# Patient Record
Sex: FEMALE | Race: WHITE | NOT HISPANIC OR LATINO
[De-identification: names, ages, dates, MRNs, and addresses within clinical notes are randomized per-mention and may not be internally consistent; named-entity substitution may affect disease eponyms.]

---

## 2024-06-14 ENCOUNTER — NON-APPOINTMENT (OUTPATIENT)
Age: 24
End: 2024-06-14

## 2024-06-14 ENCOUNTER — APPOINTMENT (OUTPATIENT)
Dept: OPHTHALMOLOGY | Facility: CLINIC | Age: 24
End: 2024-06-14
Payer: COMMERCIAL

## 2024-06-14 PROCEDURE — 92083 EXTENDED VISUAL FIELD XM: CPT

## 2024-06-14 PROCEDURE — 92004 COMPRE OPH EXAM NEW PT 1/>: CPT

## 2024-06-14 PROCEDURE — 92060 SENSORIMOTOR EXAMINATION: CPT

## 2024-06-23 ENCOUNTER — TRANSCRIPTION ENCOUNTER (OUTPATIENT)
Age: 24
End: 2024-06-23

## 2024-07-01 ENCOUNTER — APPOINTMENT (OUTPATIENT)
Dept: MRI IMAGING | Facility: CLINIC | Age: 24
End: 2024-07-01
Payer: COMMERCIAL

## 2024-07-01 PROCEDURE — 70543 MRI ORBT/FAC/NCK W/O &W/DYE: CPT

## 2024-07-01 PROCEDURE — A9585: CPT | Mod: JW

## 2024-07-01 PROCEDURE — 70553 MRI BRAIN STEM W/O & W/DYE: CPT

## 2024-07-09 ENCOUNTER — NON-APPOINTMENT (OUTPATIENT)
Age: 24
End: 2024-07-09

## 2024-07-09 ENCOUNTER — APPOINTMENT (OUTPATIENT)
Dept: OPHTHALMOLOGY | Facility: CLINIC | Age: 24
End: 2024-07-09
Payer: COMMERCIAL

## 2024-07-09 PROBLEM — Z00.00 ENCOUNTER FOR PREVENTIVE HEALTH EXAMINATION: Status: ACTIVE | Noted: 2024-07-09

## 2024-07-09 PROCEDURE — 92083 EXTENDED VISUAL FIELD XM: CPT

## 2024-07-09 PROCEDURE — 92014 COMPRE OPH EXAM EST PT 1/>: CPT

## 2024-07-09 PROCEDURE — 92060 SENSORIMOTOR EXAMINATION: CPT

## 2024-07-15 ENCOUNTER — APPOINTMENT (OUTPATIENT)
Dept: NEUROLOGY | Facility: CLINIC | Age: 24
End: 2024-07-15
Payer: COMMERCIAL

## 2024-07-15 ENCOUNTER — NON-APPOINTMENT (OUTPATIENT)
Age: 24
End: 2024-07-15

## 2024-07-15 ENCOUNTER — LABORATORY RESULT (OUTPATIENT)
Age: 24
End: 2024-07-15

## 2024-07-15 VITALS
BODY MASS INDEX: 34.15 KG/M2 | WEIGHT: 200 LBS | HEART RATE: 78 BPM | OXYGEN SATURATION: 98 % | TEMPERATURE: 98 F | HEIGHT: 64 IN | DIASTOLIC BLOOD PRESSURE: 83 MMHG | SYSTOLIC BLOOD PRESSURE: 122 MMHG

## 2024-07-15 DIAGNOSIS — Z83.3 FAMILY HISTORY OF DIABETES MELLITUS: ICD-10-CM

## 2024-07-15 DIAGNOSIS — Z78.9 OTHER SPECIFIED HEALTH STATUS: ICD-10-CM

## 2024-07-15 DIAGNOSIS — E11.628 TYPE 2 DIABETES MELLITUS WITH OTHER SKIN COMPLICATIONS: ICD-10-CM

## 2024-07-15 DIAGNOSIS — G37.9 DEMYELINATING DISEASE OF CENTRAL NERVOUS SYSTEM, UNSPECIFIED: ICD-10-CM

## 2024-07-15 DIAGNOSIS — H46.9 UNSPECIFIED OPTIC NEURITIS: ICD-10-CM

## 2024-07-15 DIAGNOSIS — E11.638 TYPE 2 DIABETES MELLITUS WITH OTHER ORAL COMPLICATIONS: ICD-10-CM

## 2024-07-15 PROCEDURE — G2211 COMPLEX E/M VISIT ADD ON: CPT | Mod: NC,1L

## 2024-07-15 PROCEDURE — 99205 OFFICE O/P NEW HI 60 MIN: CPT

## 2024-07-15 RX ORDER — METHYLPHENIDATE HYDROCHLORIDE 40 MG/1
40 CAPSULE, EXTENDED RELEASE ORAL
Refills: 0 | Status: ACTIVE | COMMUNITY

## 2024-07-16 LAB
25(OH)D3 SERPL-MCNC: 24.2 NG/ML
ALBUMIN SERPL ELPH-MCNC: 4.5 G/DL
ALP BLD-CCNC: 67 U/L
ALT SERPL-CCNC: 19 U/L
ANION GAP SERPL CALC-SCNC: 11 MMOL/L
APTT BLD: 30.2 SEC
AST SERPL-CCNC: 17 U/L
BASOPHILS # BLD AUTO: 0.04 K/UL
BASOPHILS NFR BLD AUTO: 0.8 %
BILIRUB SERPL-MCNC: 0.4 MG/DL
BUN SERPL-MCNC: 7 MG/DL
CALCIUM SERPL-MCNC: 9.7 MG/DL
CD16+CD56+ CELLS # BLD: 59 CELLS/UL
CD16+CD56+ CELLS NFR BLD: 5 %
CD19 CELLS NFR BLD: 113 CELLS/UL
CD3 CELLS # BLD: 864 CELLS/UL
CD3 CELLS NFR BLD: 81 %
CD3+CD4+ CELLS # BLD: 594 CELLS/UL
CD3+CD4+ CELLS NFR BLD: 56 %
CD3+CD4+ CELLS/CD3+CD8+ CLL SPEC: 2.59 RATIO
CD3+CD8+ CELLS # SPEC: 229 CELLS/UL
CD3+CD8+ CELLS NFR BLD: 21 %
CELLS.CD3-CD19+/CELLS IN BLOOD: 11 %
CHLORIDE SERPL-SCNC: 106 MMOL/L
CHOLEST SERPL-MCNC: 187 MG/DL
CO2 SERPL-SCNC: 23 MMOL/L
CREAT SERPL-MCNC: 0.78 MG/DL
DEPRECATED KAPPA LC FREE/LAMBDA SER: 1.05 RATIO
DSDNA AB SER-ACNC: <1 IU/ML
EGFR: 108 ML/MIN/1.73M2
ENA RNP AB SER IA-ACNC: <0.2 AL
ENA SM AB SER IA-ACNC: <0.2 AL
ENA SS-A AB SER IA-ACNC: <0.2 AL
ENA SS-B AB SER IA-ACNC: <0.2 AL
EOSINOPHIL # BLD AUTO: 0.1 K/UL
EOSINOPHIL NFR BLD AUTO: 2.1 %
ESTIMATED AVERAGE GLUCOSE: 105 MG/DL
GLUCOSE SERPL-MCNC: 83 MG/DL
HBA1C MFR BLD HPLC: 5.3 %
HBV CORE IGG+IGM SER QL: NONREACTIVE
HBV SURFACE AB SERPL IA-ACNC: 522.2 MIU/ML
HBV SURFACE AG SER QL: NONREACTIVE
HCT VFR BLD CALC: 46.2 %
HCV AB SER QL: NONREACTIVE
HCV S/CO RATIO: 0.08 S/CO
HDLC SERPL-MCNC: 44 MG/DL
HGB BLD-MCNC: 14.8 G/DL
HIV1+2 AB SPEC QL IA.RAPID: NONREACTIVE
IGA SER QL IEP: 201 MG/DL
IGG SER QL IEP: 1280 MG/DL
IGM SER QL IEP: 110 MG/DL
IMM GRANULOCYTES NFR BLD AUTO: 0.4 %
INR PPP: 0.95 RATIO
KAPPA LC CSF-MCNC: 1.49 MG/DL
KAPPA LC SERPL-MCNC: 1.57 MG/DL
LDLC SERPL CALC-MCNC: 127 MG/DL
LYMPHOCYTES # BLD AUTO: 1.3 K/UL
LYMPHOCYTES NFR BLD AUTO: 26.8 %
MAN DIFF?: NORMAL
MCHC RBC-ENTMCNC: 28.1 PG
MCHC RBC-ENTMCNC: 32 GM/DL
MCV RBC AUTO: 87.7 FL
MONOCYTES # BLD AUTO: 0.41 K/UL
MONOCYTES NFR BLD AUTO: 8.5 %
NEUTROPHILS # BLD AUTO: 2.98 K/UL
NEUTROPHILS NFR BLD AUTO: 61.4 %
NONHDLC SERPL-MCNC: 142 MG/DL
PLATELET # BLD AUTO: 237 K/UL
POTASSIUM SERPL-SCNC: 4 MMOL/L
PROT SERPL-MCNC: 7.4 G/DL
PT BLD: 10.8 SEC
RBC # BLD: 5.27 M/UL
RBC # FLD: 13.1 %
RHEUMATOID FACT SER QL: <10 IU/ML
SODIUM SERPL-SCNC: 140 MMOL/L
TRIGL SERPL-MCNC: 85 MG/DL
TSH SERPL-ACNC: 1.31 UIU/ML
VIT B12 SERPL-MCNC: 327 PG/ML
WBC # FLD AUTO: 4.85 K/UL

## 2024-07-17 LAB
ANA SER IF-ACNC: NEGATIVE
M TB IFN-G BLD-IMP: NEGATIVE
QUANTIFERON TB PLUS MITOGEN MINUS NIL: >10 IU/ML
QUANTIFERON TB PLUS NIL: 0.05 IU/ML
QUANTIFERON TB PLUS TB1 MINUS NIL: 0 IU/ML
QUANTIFERON TB PLUS TB2 MINUS NIL: 0 IU/ML

## 2024-07-22 LAB
AQP4 H2O CHANNEL AB SERPL IA-ACNC: NEGATIVE
T PALLIDUM AB SER QL IA: NEGATIVE
VZV AB TITR SER: NEGATIVE
VZV IGG SER IF-ACNC: 100.9 INDEX

## 2024-07-23 LAB
JCV INDEX: 2.68
STRATIFY JCV ANTIBODY: POSITIVE

## 2024-07-24 ENCOUNTER — APPOINTMENT (OUTPATIENT)
Dept: MRI IMAGING | Facility: CLINIC | Age: 24
End: 2024-07-24
Payer: COMMERCIAL

## 2024-07-24 LAB — MOG AB SER QL CBA IFA: NEGATIVE

## 2024-07-24 PROCEDURE — A9585: CPT | Mod: JW

## 2024-07-24 PROCEDURE — 72156 MRI NECK SPINE W/O & W/DYE: CPT

## 2024-07-24 PROCEDURE — 72157 MRI CHEST SPINE W/O & W/DYE: CPT

## 2024-07-29 ENCOUNTER — RESULT REVIEW (OUTPATIENT)
Age: 24
End: 2024-07-29

## 2024-07-29 ENCOUNTER — APPOINTMENT (OUTPATIENT)
Dept: INTERVENTIONAL RADIOLOGY/VASCULAR | Facility: HOSPITAL | Age: 24
End: 2024-07-29

## 2024-07-29 ENCOUNTER — OUTPATIENT (OUTPATIENT)
Dept: OUTPATIENT SERVICES | Facility: HOSPITAL | Age: 24
LOS: 1 days | End: 2024-07-29
Payer: COMMERCIAL

## 2024-07-29 LAB
APPEARANCE CSF: CLEAR — SIGNIFICANT CHANGE UP
APPEARANCE SPUN FLD: COLORLESS — SIGNIFICANT CHANGE UP
COLOR CSF: SIGNIFICANT CHANGE UP
CSF COMMENTS: SIGNIFICANT CHANGE UP
GLUCOSE CSF-MCNC: 59 MG/DL — SIGNIFICANT CHANGE UP (ref 40–70)
GRAM STN FLD: SIGNIFICANT CHANGE UP
GRAM STN FLD: SIGNIFICANT CHANGE UP
LYMPHOCYTES # CSF: 1 % — LOW (ref 40–80)
MONOS+MACROS NFR CSF: 1 % — LOW (ref 15–45)
NEUTROPHILS # CSF: 0 % — SIGNIFICANT CHANGE UP (ref 0–6)
NRBC NFR CSF: 2 /UL — SIGNIFICANT CHANGE UP (ref 0–5)
PROT CSF-MCNC: 23 MG/DL — SIGNIFICANT CHANGE UP (ref 15–45)
RBC # CSF: 1 /UL — HIGH (ref 0–0)
SPECIMEN SOURCE: SIGNIFICANT CHANGE UP
TUBE TYPE: SIGNIFICANT CHANGE UP

## 2024-07-29 PROCEDURE — 83916 OLIGOCLONAL BANDS: CPT

## 2024-07-29 PROCEDURE — 99152 MOD SED SAME PHYS/QHP 5/>YRS: CPT

## 2024-07-29 PROCEDURE — 62328 DX LMBR SPI PNXR W/FLUOR/CT: CPT

## 2024-07-29 PROCEDURE — 84157 ASSAY OF PROTEIN OTHER: CPT

## 2024-07-29 PROCEDURE — 87070 CULTURE OTHR SPECIMN AEROBIC: CPT

## 2024-07-29 PROCEDURE — 36415 COLL VENOUS BLD VENIPUNCTURE: CPT

## 2024-07-29 PROCEDURE — 89051 BODY FLUID CELL COUNT: CPT

## 2024-07-29 PROCEDURE — 88108 CYTOPATH CONCENTRATE TECH: CPT | Mod: 26

## 2024-07-29 PROCEDURE — 99153 MOD SED SAME PHYS/QHP EA: CPT

## 2024-07-29 PROCEDURE — 87530 HSV DNA QUANT: CPT

## 2024-07-29 PROCEDURE — 82945 GLUCOSE OTHER FLUID: CPT

## 2024-07-29 PROCEDURE — 87102 FUNGUS ISOLATION CULTURE: CPT

## 2024-07-29 PROCEDURE — 87205 SMEAR GRAM STAIN: CPT

## 2024-07-29 PROCEDURE — 88305 TISSUE EXAM BY PATHOLOGIST: CPT | Mod: 26

## 2024-07-31 LAB
CMV DNA # UR NAA DL=200: SIGNIFICANT CHANGE UP IU/ML
NON-GYNECOLOGICAL CYTOLOGY STUDY: SIGNIFICANT CHANGE UP

## 2024-08-03 LAB
CULTURE RESULTS: SIGNIFICANT CHANGE UP
SPECIMEN SOURCE: SIGNIFICANT CHANGE UP

## 2024-08-06 LAB
MISCELLANEOUS TEST NAME: SIGNIFICANT CHANGE UP
OLIGOCLONAL BANDS CSF ELPH-IMP: PRESENT

## 2024-08-19 ENCOUNTER — APPOINTMENT (OUTPATIENT)
Dept: NEUROLOGY | Facility: CLINIC | Age: 24
End: 2024-08-19
Payer: COMMERCIAL

## 2024-08-19 VITALS
OXYGEN SATURATION: 98 % | HEIGHT: 64 IN | HEART RATE: 77 BPM | BODY MASS INDEX: 34.15 KG/M2 | WEIGHT: 200 LBS | DIASTOLIC BLOOD PRESSURE: 78 MMHG | SYSTOLIC BLOOD PRESSURE: 108 MMHG | TEMPERATURE: 98.1 F

## 2024-08-19 DIAGNOSIS — G35 MULTIPLE SCLEROSIS: ICD-10-CM

## 2024-08-19 DIAGNOSIS — H81.90 UNSPECIFIED DISORDER OF VESTIBULAR FUNCTION, UNSPECIFIED EAR: ICD-10-CM

## 2024-08-19 DIAGNOSIS — G37.9 DEMYELINATING DISEASE OF CENTRAL NERVOUS SYSTEM, UNSPECIFIED: ICD-10-CM

## 2024-08-19 PROCEDURE — G2211 COMPLEX E/M VISIT ADD ON: CPT | Mod: NC

## 2024-08-19 PROCEDURE — 99215 OFFICE O/P EST HI 40 MIN: CPT

## 2024-08-21 DIAGNOSIS — H46.9 UNSPECIFIED OPTIC NEURITIS: ICD-10-CM

## 2024-08-21 RX ORDER — OCRELIZUMAB 300 MG/10ML
300 INJECTION INTRAVENOUS
Qty: 2 | Refills: 0 | Status: ACTIVE | COMMUNITY
Start: 2024-08-21 | End: 1900-01-01

## 2024-08-26 PROBLEM — G35 RELAPSING-REMITTING MULTIPLE SCLEROSIS: Status: ACTIVE | Noted: 2024-08-26

## 2024-08-26 RX ORDER — OCRELIZUMAB 300 MG/10ML
300 INJECTION INTRAVENOUS
Qty: 2 | Refills: 0 | Status: ACTIVE | COMMUNITY
Start: 2024-08-26 | End: 1900-01-01

## 2024-08-26 NOTE — HISTORY OF PRESENT ILLNESS
[FreeTextEntry1] : 24-year-old woman referred for right optic neuritis.  She reports symptoms began June 2024 developing a bright central scotoma and pain exacerbated by eye movements was referred to ophthalmology had an MRI that was abnormal that demonstrated an enhancing lesion of the right optic nerve and brain lesions typical for MS.  Patient denies brain fog numbness weakness imbalance bladder dysfunction constipation. Patient's vision is back to baseline. with resolution of pain.  August 19, 2024 Patient has been experiencing vestibular symptoms that have woken her from sleep for the past several days that began last week.  The episodes last several minutes then resolve.  No new focal deficits otherwise.   Past medical history includes ADHD and psoriasis. Medications include Ritalin. No known drug allergies

## 2024-08-26 NOTE — DATA REVIEWED
[de-identified] : MRI brain July 1, 2024 demonstrates 8 scattered lesions in the periventricular , subcortical, and juxtacortical white matter of the bilateral brain parenchyma, with an appearance most suggestive of a demyelinating process. None of the lesions enhance to suggest an active lesion. No infratentorial lesions are identified.  MRI cervical and thoracic spine with and without gadolinium July 24, 2024 There are no areas of signal abnormality or abnormal enhancement within the cervicothoracic cord to suggest sequelae of chronic or active demyelination. Mild cervical spondylosis without significant central canal or neural foraminal narrowing throughout the cervical or thoracic spine.  Lumbar puncture July 29, 2024 CSF WBC 2 RBC 1 protein 23 glucose 59, oligoclonal bands present, cultures Gram stain and viral PCR negative, cytology and flow cytometry negative  Labs reviewed from July 2024 hep B surface antibody positive, surface antigen negative, hep C negative, rheumatologic screening negative, VZV immune, HIV and syphilis negative, MOG and aquaporin 4 autoantibody negative, ,A1c 5.3

## 2024-08-26 NOTE — ASSESSMENT
Referred by: Cori Cuevas MD; Medical Diagnosis (from order):    Diagnosis Information      Diagnosis    724.4 (ICD-9-CM) - M54.16 (ICD-10-CM) - Lumbar radiculopathy                Physical Therapy -  Daily Treatment Note    Visit:  9     SUBJECTIVE                                                                                                             Patient reports she had severe right hip pain with picking up her five year old granddaughter. It is still sore. Pain / Symptoms:  Pain rating (out of 10): Current: 4     OBJECTIVE                                                                                                                          TREATMENT                                                                                                                  Therapeutic Exercise:  NuStep level 6 x 6 min   PROM hip IR/ER right  Bridges with green band around thighs x 20  Low ab brace with marches x 20 bilaterally  3D hip stretching all directions x 10 each bilaterally  Green band side stepping L/R x 30 ft each  Green band monster walk F/B x 30 ft each  Green band [place] walk F/B x 30 ft each    Manual Therapy:  Lateral hip mobs at varying angles with mobilization belt grade III right  Long axis hip traction right        Skilled input: verbal instruction/cues and tactile instruction/cues    Writer verbally educated and received verbal consent for hand placement, positioning of patient, and techniques to be performed today from patient for therapist position for techniques and hand placement and palpation for techniques as described above and how they are pertinent to the patient's plan of care. Home Exercise Program: (*above indicates provided as part of home exercise program)   Access Code: [email or web address]   URL: [email or web address]   Date: 07/15/2020   Prepared by: [address] [place] [address]: Hamstring stretch - 2 sets - 30 hold - 1x daily - 7x weekly   Hip Flexor [FreeTextEntry1] : 24-year-old woman with right optic neuritis and brain MRI typical for MS with periventricular and juxtacortical T2 lesions.  CSF specific oligoclonal bands present. Altogether, this patient meets 2017 [place] Diagnostic Criteria for relapsing-remitting multiple sclerosis with at least one clinical attack, dissemination in space and time, and reasonable exclusion of alternative etiologies.  Recent vestibular symptoms concerning for neuroinflammatory activity versus paroxysmal vestibular symptoms related to a symptomatic lesion.    I discussed the diagnosis with the patient providing insights into the natural history of the disease, contributory risk factors, lifestyle modification (diet, exercise, avoiding smoking, vitamin D supplementation), and recommendation for early treatment with an approved MS-specific disease modifying therapy, with preference for a high-efficacy agent such as ofatumumab, ocrelizumab.  given preferred route of administration and risk factors for aggressive disease (e.g., high T2 burden at time of diagnosis, recent vestibular symptoms). 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 Averse to taking daily pills.        Plan:   Start Ocrevus  CBC, CMP, immunoglobulin, T cell subset every 6 months   Vitamin D 2000 U oral supplement daily   Physical therapy and stay active with fall precautions.   Counselled on Uthoff phenomena, avoid excessive overheating when possible   May consider gabapentin if neuropathic symptoms become bothersome to patient   Return to clinic next week to discuss initiation of MS-specific treatment   Stretch at [employer] of Bed - 2 sets - 30 hold - 1x daily - 7x weekly   Bridge - 10 reps - 2 sets - 5 hold - 1x daily - 7x weekly   Clamshell with Resistance - 10 reps - 2 sets - 1x daily - 7x weekly   Butterfly Bridge - 10 reps - 2 sets - 1x daily - 7x weekly   Side Stepping with Resistance at Ankles - 10 reps - 3 sets - 1x daily - 7x weekly   Forward Monster Walks - 10 reps - 3 sets - 1x daily - 7x weekly   Backward Monster Walks - 10 reps - 3 sets - 1x daily - 7x weekly   Prone Heel Squeeze - 10 reps - 2 sets - 5 hold - 1x daily - 7x weekly   [place]: Thoracic type 1 right rotation, left SB - 10 reps - 1 sets - 2x daily - 7x weekly   [place]: thoracic type 1 left rotation, right SB - 10 reps - 1 sets - 2x daily - 7x weekly   [place]: thoracic type 2 left rotation, left SB - 10 reps - 1 sets - 2x daily - 7x weekly   [place]: Thoracic type 2 right rotation right SB - 10 reps - 1 sets - 2x daily - 7x weekly         ASSESSMENT                                                                                                             Patient reported increased muscle soreness after session. She reported feeling stretching in both hips with 3D hip stretching. PLAN                                                                                                                             Suggestions for next session as indicated: Progress per plan of care, continue with hip mobs, core strengthening, hip strengthening       Procedures and total treatment time documented Time Entry flowsheet.

## 2024-08-26 NOTE — ASSESSMENT
[FreeTextEntry1] : 24-year-old woman with right optic neuritis and brain MRI typical for MS with periventricular and juxtacortical T2 lesions.  CSF specific oligoclonal bands present. Altogether, this patient meets 2017 [place] Diagnostic Criteria for relapsing-remitting multiple sclerosis with at least one clinical attack, dissemination in space and time, and reasonable exclusion of alternative etiologies.  Recent vestibular symptoms concerning for neuroinflammatory activity versus paroxysmal vestibular symptoms related to a symptomatic lesion.    I discussed the diagnosis with the patient providing insights into the natural history of the disease, contributory risk factors, lifestyle modification (diet, exercise, avoiding smoking, vitamin D supplementation), and recommendation for early treatment with an approved MS-specific disease modifying therapy, with preference for a high-efficacy agent such as ofatumumab, ocrelizumab.  given preferred route of administration and risk factors for aggressive disease (e.g., high T2 burden at time of diagnosis, recent vestibular symptoms). 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 Averse to taking daily pills.        Plan:   Start Ocrevus  CBC, CMP, immunoglobulin, T cell subset every 6 months   Vitamin D 2000 U oral supplement daily   Physical therapy and stay active with fall precautions.   Counselled on Uthoff phenomena, avoid excessive overheating when possible   May consider gabapentin if neuropathic symptoms become bothersome to patient   Return to clinic next week to discuss initiation of MS-specific treatment

## 2024-08-26 NOTE — PHYSICAL EXAM
[FreeTextEntry1] : AO3. Normally conversant. full affect. Follows commands, names, and repeats. Good attention.     PERRL, VFF, EOMI, horizontal nystagmus present (L>R) bilaterally, face symmetric, TUP at midline.     Motor:        R:    L:  Del      5    5  Bi      5    5  Tri      5    5  Wrist Extensors   5    5  Finger abductors   5    5       5    5     HF      5    5  KE      5    5  KF      5    5  DF      5    5  PF      5    5     Tone     R    L  UE        0     0  LE        0     0     Sensory    RUE   LUE   RLE  LLE  LT      +    +   +   +  Vib      +    +   +   +  JPS      +    +   +   +  PP      +    +   +   +  Temp     +    +   +   +     Reflexes:        R    L   Biceps  3 3  BR  3 3  Pat    3 3  AJ     3 3        Coordination:        R    L  FTN     0    0  BHUPINDER     0    0  HTS     0    0     Other            Gait: normal, can heel, toe, tandem        EDSS Step: 2.0       Functional system scores: Visual 0 Brainstem 2 Pyramidal 1 Cerebellar 0 Sensory 0 Bowel and bladder 0 Cerebral 0 Unrestricted gait

## 2024-08-26 NOTE — DATA REVIEWED
[de-identified] : MRI brain July 1, 2024 demonstrates 8 scattered lesions in the periventricular , subcortical, and juxtacortical white matter of the bilateral brain parenchyma, with an appearance most suggestive of a demyelinating process. None of the lesions enhance to suggest an active lesion. No infratentorial lesions are identified.  MRI cervical and thoracic spine with and without gadolinium July 24, 2024 There are no areas of signal abnormality or abnormal enhancement within the cervicothoracic cord to suggest sequelae of chronic or active demyelination. Mild cervical spondylosis without significant central canal or neural foraminal narrowing throughout the cervical or thoracic spine.  Lumbar puncture July 29, 2024 CSF WBC 2 RBC 1 protein 23 glucose 59, oligoclonal bands present, cultures Gram stain and viral PCR negative, cytology and flow cytometry negative  Labs reviewed from July 2024 hep B surface antibody positive, surface antigen negative, hep C negative, rheumatologic screening negative, VZV immune, HIV and syphilis negative, MOG and aquaporin 4 autoantibody negative, ,A1c 5.3

## 2024-09-09 ENCOUNTER — NON-APPOINTMENT (OUTPATIENT)
Age: 24
End: 2024-09-09

## 2024-09-09 ENCOUNTER — INPATIENT (INPATIENT)
Facility: HOSPITAL | Age: 24
LOS: 3 days | Discharge: HOME | DRG: 60 | End: 2024-09-13
Attending: PSYCHIATRY & NEUROLOGY | Admitting: PSYCHIATRY & NEUROLOGY
Payer: COMMERCIAL

## 2024-09-09 VITALS
WEIGHT: 199.96 LBS | TEMPERATURE: 98 F | RESPIRATION RATE: 16 BRPM | HEART RATE: 73 BPM | OXYGEN SATURATION: 99 % | DIASTOLIC BLOOD PRESSURE: 85 MMHG | SYSTOLIC BLOOD PRESSURE: 125 MMHG | HEIGHT: 64 IN

## 2024-09-09 LAB
ANION GAP SERPL CALC-SCNC: 9 MMOL/L — SIGNIFICANT CHANGE UP (ref 5–17)
BASOPHILS # BLD AUTO: 0.05 K/UL — SIGNIFICANT CHANGE UP (ref 0–0.2)
BASOPHILS NFR BLD AUTO: 0.7 % — SIGNIFICANT CHANGE UP (ref 0–2)
BUN SERPL-MCNC: 10 MG/DL — SIGNIFICANT CHANGE UP (ref 7–23)
CALCIUM SERPL-MCNC: 9.4 MG/DL — SIGNIFICANT CHANGE UP (ref 8.4–10.5)
CHLORIDE SERPL-SCNC: 104 MMOL/L — SIGNIFICANT CHANGE UP (ref 96–108)
CO2 SERPL-SCNC: 26 MMOL/L — SIGNIFICANT CHANGE UP (ref 22–31)
CREAT SERPL-MCNC: 0.84 MG/DL — SIGNIFICANT CHANGE UP (ref 0.5–1.3)
EGFR: 99 ML/MIN/1.73M2 — SIGNIFICANT CHANGE UP
EOSINOPHIL # BLD AUTO: 0.15 K/UL — SIGNIFICANT CHANGE UP (ref 0–0.5)
EOSINOPHIL NFR BLD AUTO: 2.1 % — SIGNIFICANT CHANGE UP (ref 0–6)
GLUCOSE SERPL-MCNC: 100 MG/DL — HIGH (ref 70–99)
HCT VFR BLD CALC: 44 % — SIGNIFICANT CHANGE UP (ref 34.5–45)
HGB BLD-MCNC: 14.7 G/DL — SIGNIFICANT CHANGE UP (ref 11.5–15.5)
IMM GRANULOCYTES NFR BLD AUTO: 0.3 % — SIGNIFICANT CHANGE UP (ref 0–0.9)
LYMPHOCYTES # BLD AUTO: 2.33 K/UL — SIGNIFICANT CHANGE UP (ref 1–3.3)
LYMPHOCYTES # BLD AUTO: 32.8 % — SIGNIFICANT CHANGE UP (ref 13–44)
MCHC RBC-ENTMCNC: 27.9 PG — SIGNIFICANT CHANGE UP (ref 27–34)
MCHC RBC-ENTMCNC: 33.4 GM/DL — SIGNIFICANT CHANGE UP (ref 32–36)
MCV RBC AUTO: 83.7 FL — SIGNIFICANT CHANGE UP (ref 80–100)
MONOCYTES # BLD AUTO: 0.46 K/UL — SIGNIFICANT CHANGE UP (ref 0–0.9)
MONOCYTES NFR BLD AUTO: 6.5 % — SIGNIFICANT CHANGE UP (ref 2–14)
NEUTROPHILS # BLD AUTO: 4.09 K/UL — SIGNIFICANT CHANGE UP (ref 1.8–7.4)
NEUTROPHILS NFR BLD AUTO: 57.6 % — SIGNIFICANT CHANGE UP (ref 43–77)
NRBC # BLD: 0 /100 WBCS — SIGNIFICANT CHANGE UP (ref 0–0)
PLATELET # BLD AUTO: 321 K/UL — SIGNIFICANT CHANGE UP (ref 150–400)
POTASSIUM SERPL-MCNC: 4.7 MMOL/L — SIGNIFICANT CHANGE UP (ref 3.5–5.3)
POTASSIUM SERPL-SCNC: 4.7 MMOL/L — SIGNIFICANT CHANGE UP (ref 3.5–5.3)
RBC # BLD: 5.26 M/UL — HIGH (ref 3.8–5.2)
RBC # FLD: 12.1 % — SIGNIFICANT CHANGE UP (ref 10.3–14.5)
SODIUM SERPL-SCNC: 139 MMOL/L — SIGNIFICANT CHANGE UP (ref 135–145)
WBC # BLD: 7.1 K/UL — SIGNIFICANT CHANGE UP (ref 3.8–10.5)
WBC # FLD AUTO: 7.1 K/UL — SIGNIFICANT CHANGE UP (ref 3.8–10.5)

## 2024-09-09 PROCEDURE — 99285 EMERGENCY DEPT VISIT HI MDM: CPT

## 2024-09-09 PROCEDURE — 99222 1ST HOSP IP/OBS MODERATE 55: CPT

## 2024-09-09 RX ORDER — METHYLPREDNISOLONE 4 MG
1000 TABLET ORAL ONCE
Refills: 0 | Status: COMPLETED | OUTPATIENT
Start: 2024-09-09 | End: 2024-09-09

## 2024-09-09 RX ORDER — PANTOPRAZOLE SODIUM 40 MG
40 TABLET, DELAYED RELEASE (ENTERIC COATED) ORAL
Refills: 0 | Status: DISCONTINUED | OUTPATIENT
Start: 2024-09-09 | End: 2024-09-13

## 2024-09-09 RX ORDER — ACETAMINOPHEN 325 MG/1
650 TABLET ORAL EVERY 6 HOURS
Refills: 0 | Status: DISCONTINUED | OUTPATIENT
Start: 2024-09-09 | End: 2024-09-13

## 2024-09-09 RX ORDER — ENOXAPARIN SODIUM 100 MG/ML
40 INJECTION SUBCUTANEOUS ONCE
Refills: 0 | Status: DISCONTINUED | OUTPATIENT
Start: 2024-09-09 | End: 2024-09-10

## 2024-09-09 RX ORDER — METHYLPREDNISOLONE 4 MG
1000 TABLET ORAL EVERY 24 HOURS
Refills: 0 | Status: DISCONTINUED | OUTPATIENT
Start: 2024-09-10 | End: 2024-09-11

## 2024-09-09 RX ORDER — ENOXAPARIN SODIUM 100 MG/ML
40 INJECTION SUBCUTANEOUS EVERY 24 HOURS
Refills: 0 | Status: DISCONTINUED | OUTPATIENT
Start: 2024-09-09 | End: 2024-09-09

## 2024-09-09 RX ORDER — LORAZEPAM 4 MG/ML
0.5 INJECTION INTRAMUSCULAR; INTRAVENOUS ONCE
Refills: 0 | Status: DISCONTINUED | OUTPATIENT
Start: 2024-09-09 | End: 2024-09-11

## 2024-09-09 NOTE — H&P ADULT - HISTORY OF PRESENT ILLNESS
Ms. Sands is a 24-year-old female with a PMHx of MS, who presents for 1 week of new left leg dragging/incoordination concerning for a relapse. The patient was diangosed with MS in August 2024, following an encounter for right optic neuritis in July. Brain MRI at the time was typical for MS with periventricular and juxtacortical T2 lesions. CSF specific oligoclonal bands were present. The patient states that she feels as if the leg is weak, and that if she puts weight on it, it might "give out", but she denies any numbness or tingling. She also denies falls, as well as urinary, respiratory, and infectious symptoms. She also denies vision loss, vertigo, and loss of consciousness.     ED Course:  V/S:  Temp: 98.5, HR: 73, BP: 125/85, RR: 16, SpO2: 99 RA  Pertinent Labs:  RBC: 5.26  EKG:  Pending  Imaging:  None  Interventions:  - Methylprednisolone 1g IV x 1 Ms. Sands is a 24-year-old female with a PMHx of MS, who presents for 1 week of new left leg dragging/incoordination concerning for a relapse. The patient was diagnosed with MS in August 2024, following an encounter for right optic neuritis in July (vision returned fully back to baseline). Brain MRI at the time was typical for MS with periventricular and juxtacortical T2 lesions. CSF specific oligoclonal bands were present. The patient states that she feels as if the leg is weak, and that if she puts weight on it, it might "give out", but she denies any numbness or tingling. She also denies falls, as well as urinary, respiratory, and infectious symptoms. She also denies vision loss, vertigo, and loss of consciousness.     ED Course:  V/S:  Temp: 98.5, HR: 73, BP: 125/85, RR: 16, SpO2: 99 RA  Pertinent Labs:  RBC: 5.26  EKG:  Pending  Imaging:  None  Interventions:  - Methylprednisolone 1g IV x 1

## 2024-09-09 NOTE — ED PROVIDER NOTE - CLINICAL SUMMARY MEDICAL DECISION MAKING FREE TEXT BOX
Normal vitals  4+/5 strength LLE, 5/5 strength all other extremities, sensation intact, gait deferred at this time  Consulted neurology, plan for basic labs, 1 g methylprednisolone IV, admission for further workup and treatment

## 2024-09-09 NOTE — H&P ADULT - NSHPSOCIALHISTORY_GEN_ALL_CORE
The patient lives in an apartment with roommates. She denies tobacco use and recreational drug use, but endorses social alcohol use (none since her current symptoms started)

## 2024-09-09 NOTE — H&P ADULT - NSHPLABSRESULTS_GEN_ALL_CORE
.  LABS:                         14.7   7.10  )-----------( 321      ( 09 Sep 2024 20:23 )             44.0     09-09    139  |  104  |  10  ----------------------------<  100<H>  4.7   |  26  |  0.84    Ca    9.4      09 Sep 2024 20:23        Urinalysis Basic - ( 09 Sep 2024 20:23 )    Color: x / Appearance: x / SG: x / pH: x  Gluc: 100 mg/dL / Ketone: x  / Bili: x / Urobili: x   Blood: x / Protein: x / Nitrite: x   Leuk Esterase: x / RBC: x / WBC x   Sq Epi: x / Non Sq Epi: x / Bacteria: x                RADIOLOGY, EKG & ADDITIONAL TESTS: Reviewed.

## 2024-09-09 NOTE — ED ADULT NURSE NOTE - OBJECTIVE STATEMENT
24 y.o. Female presents to ED c/o L leg weakness/difficulty ambulating. Recently diagnosed with multiple sclerosis a few weeks ago. Denies cp, sob, f/c, numbness/tingling, fall/trauma/injury. ambulates without assistive device. sent in by MD Ferrer the neurologist that diagnosed MS for administration of steroids. LMP 1 week ago.

## 2024-09-09 NOTE — H&P ADULT - ATTENDING COMMENTS
patient with multiple sclerosis. she had initial clinical event of optic neuritis in July but imaging c/w MS and now already with apparent MS exacerbation, suspect new thoracic spine lesion based on exam, but could be C spine or brain as well.    admit for 5 days solumedrol 1g IV daily  MRI brain, c, t, w and w/o  she will be getting her first ocrevus infusion after discharge

## 2024-09-09 NOTE — H&P ADULT - NSHPPHYSICALEXAM_GEN_ALL_CORE
PHYSICAL EXAM:    Constitutional: resting comfortably in bed; NAD  Head: NC/AT  Eyes: PERRL, EOMI, anicteric sclera  ENT: no nasal discharge; uvula midline, no oropharyngeal erythema or exudates; MMM  Neck: supple; no JVD or thyromegaly  Respiratory: CTA B/L; no W/R/R, no retractions  Cardiac: +S1/S2; RRR; no M/R/G;   Gastrointestinal: abdomen soft, NT/ND; no rebound or guarding; +BSx4  Back: spine midline, no bony tenderness or step-offs; no CVAT B/L  Extremities: WWP, no clubbing or cyanosis; no peripheral edema  Musculoskeletal: NROM x4; no joint swelling, tenderness or erythema  Vascular: 2+ radial, DP pulses B/L  Dermatologic: skin warm, dry and intact; no rashes, wounds, or scars PHYSICAL EXAM:    Constitutional: resting comfortably in bed; NAD  Head: NC/AT  Eyes: PERRL, EOMI, anicteric sclera  ENT: no nasal discharge; uvula midline, no oropharyngeal erythema or exudates; MMM  Neck: supple; no JVD or thyromegaly  Respiratory: CTA B/L; no W/R/R, no retractions  Cardiac: +S1/S2; RRR; no M/R/G;   Gastrointestinal: abdomen soft, NT/ND; no rebound or guarding; +BSx4  Back: spine midline, no bony tenderness or step-offs; no CVAT B/L  Extremities: WWP, no clubbing or cyanosis; no peripheral edema  Musculoskeletal: NROM x4; no joint swelling, tenderness or erythema  Vascular: 2+ radial, DP pulses B/L  Dermatologic: skin warm, dry and intact; no rashes, wounds, or scars    Neurological Exam:   Mental status: Awake, alert and oriented x3.  Recent and remote memory intact.  Attention/concentration intact.  No dysarthria, no aphasia.   Cranial nerves: Pupils equally round and reactive to light, visual fields full, no nystagmus, extraocular muscles intact, V1 through V3 intact bilaterally and symmetric, face symmetric, hearing intact to finger rub, palate elevation symmetric, tongue was midline.  Motor:  MRC grading 5/5 b/l UE; 4+/5 LLE; 5/5 RLE.   strength 5/5.  Normal tone and bulk.  No abnormal movements.    Sensation: Intact to light touch, vibration, temperature, and pinprick.   Coordination: No dysmetria on finger-to-nose and heel-to-shin.  No dysdiadochokinesia.  Reflexes: 2+ in biceps, triceps, brachioradialis, achilles b/l. 3+ left patellar, 2+ right patellar , downgoing toes bilaterally. (-) [place].  Gait: Narrow based and normal, but unsteady. Hesitation to put full weight on LLE on ambulation. No ataxia. Romberg negative.

## 2024-09-09 NOTE — ED ADULT TRIAGE NOTE - CHIEF COMPLAINT QUOTE
Pt reports new L leg weakness/ difficulty with ambulating. Pt has MS, sent for admission by neurologist Dr. Ferrer.

## 2024-09-09 NOTE — ED ADULT NURSE NOTE - CAS TRG GENERAL AIRWAY, MLM
Mid-Line Peripheral IV Placement     Procedure Note    Diagnosis:  Acute respiratory failure and cirrhosis; need for IV access    Consent:  Informed consent obtained from the patient prior to the procedure    Procedure:    Prior to procedure site was selected with screening ultrasound. Area was prepared with Chlorhexadine prep stick.    20 gauge Powerglide Mid-Line IV access catheter advanced and introduced into the right cephalic vein under ultrasound guidance. Once tip of needle in satisafactory position guide wire and subsequently IV catheter advanced without resistance. Short IV tubing attached and line flushed with 20 cc of sterile normal saline. Biopatch and securing device placed. Tegaderm and tape utiziled to secure and protect the site. Bedside RN updated.     Ultrasound:  Portable ultrasound was used but image not saved    Proceduralist: Gordy Loomis PA-C    Complications: none    Estimated blood loss: 1 mL    Of note, prior to this a right brachial midline was placed. It infiltrated and was removed without issue. Some extravasation noted, but with compressible compartments.     Gordy Loomis PA-C  [facility]           Patent

## 2024-09-09 NOTE — ED PROVIDER NOTE - PHYSICAL EXAMINATION
CONST: nontoxic NAD speaking in full sentences  HEAD: atraumatic  EYES: conjunctivae clear  NECK: supple  CARD: regular rate  CHEST: breathing comfortably, no stridor/retractions/tripoding  EXT: FROM  SKIN: warm, dry  NEURO: awake alert answering questions following commands moving all extremities . 4+/5 strength LLE, 5/5 strength all other extremities, sensation intact bilaterally, gait deferred

## 2024-09-09 NOTE — ED PROVIDER NOTE - OBJECTIVE STATEMENT
24-year-old female with recently diagnosed MS 1 month ago (presented with optic neuritis), comes in for admission for MS flare.  Patient states this week she has been having left leg weakness and some incoordination when walking her leg sometimes drags.  No falls, no numbness, no other symptoms.  Called her neurologist today who told her to come in for steroids and imaging.

## 2024-09-09 NOTE — H&P ADULT - ASSESSMENT
The patient is a 24-year-old female with a PMHx of MS, who presents for 1 week of new left leg dragging/incoordination concerning for a relapse. Admitted for IV methylprednisolone treatment and MRI brain/spine to further characterize the etiology of her symptoms and treat for likely MS relapse. Lumbar radiculopathy can be considered if imaging negative for any acute process/new legion.     Plan:    #MS  #LE incoordination  - Start methylprednisolone 1g IV x 5 days (last day 9/13)  - Order MR brain and full spine  - Start Protonix 40mg PO q24 hours  - FSG q6 hours while on steroids  - PT/OT Consult  - Fall precautions  - Plan to start Ocrevus as an outpatient post-admission    #ppx:  F: NI  E: Replete as needed; K<4.0, Mg<2.0, Phos<2.5  N: Regular diet  GI ppx: Protonix  DVT ppx: SCDs and Lovenox  Code: Full  Dispo: [facility] - Neuro service   The patient is a 24-year-old female with a PMHx of MS, who presents for 1 week of new left leg dragging/incoordination concerning for a relapse. Admitted for IV methylprednisolone treatment and MRI brain/spine to further characterize the etiology of her symptoms and treat for likely MS relapse.     Plan:    #MS  #LE incoordination  - Start methylprednisolone 1g IV x 5 days (last day 9/13)  - Order MR brain and full spine  - Start Protonix 40mg PO q24 hours  - FSG q6 hours while on steroids  - PT/OT Consult  - Fall precautions  - Plan to start Ocrevus as an outpatient post-admission    #ppx:  F: NI  E: Replete as needed; K<4.0, Mg<2.0, Phos<2.5  N: Regular diet  GI ppx: Protonix  DVT ppx: SCDs and Lovenox  Code: Full  Dispo: [facility] - Neuro service   The patient is a 24-year-old female with a PMHx of MS, who presents for 1 week of new left leg dragging/incoordination concerning for a relapse. Admitted for IV methylprednisolone treatment and MRI brain/spine to further characterize the etiology of her symptoms and treat for likely MS relapse.     Plan:    #MS  #LE incoordination  - Start methylprednisolone 1g IV x 5 days (last day 9/13)  - Order MR brain w/w/out, and MR C/T spines w/w/out  - Start Protonix 40mg PO q24 hours  - FSG q6 hours while on steroids  - PT/OT Consult  - Fall precautions  - Plan to start Ocrevus as an outpatient post-admission    #ppx:  F: NI  E: Replete as needed; K<4.0, Mg<2.0, Phos<2.5  N: Regular diet  GI ppx: Protonix  DVT ppx: SCDs and Lovenox  Code: Full  Dispo: [facility] - Neuro service

## 2024-09-10 LAB
ALBUMIN SERPL ELPH-MCNC: 4.5 G/DL — SIGNIFICANT CHANGE UP (ref 3.3–5)
ALP SERPL-CCNC: 73 U/L — SIGNIFICANT CHANGE UP (ref 40–120)
ALT FLD-CCNC: 21 U/L — SIGNIFICANT CHANGE UP (ref 10–45)
ANION GAP SERPL CALC-SCNC: 14 MMOL/L — SIGNIFICANT CHANGE UP (ref 5–17)
AST SERPL-CCNC: 17 U/L — SIGNIFICANT CHANGE UP (ref 10–40)
BASOPHILS # BLD AUTO: 0.01 K/UL — SIGNIFICANT CHANGE UP (ref 0–0.2)
BASOPHILS NFR BLD AUTO: 0.2 % — SIGNIFICANT CHANGE UP (ref 0–2)
BILIRUB SERPL-MCNC: 0.4 MG/DL — SIGNIFICANT CHANGE UP (ref 0.2–1.2)
BUN SERPL-MCNC: 8 MG/DL — SIGNIFICANT CHANGE UP (ref 7–23)
CALCIUM SERPL-MCNC: 10 MG/DL — SIGNIFICANT CHANGE UP (ref 8.4–10.5)
CHLORIDE SERPL-SCNC: 102 MMOL/L — SIGNIFICANT CHANGE UP (ref 96–108)
CO2 SERPL-SCNC: 21 MMOL/L — LOW (ref 22–31)
CREAT SERPL-MCNC: 0.73 MG/DL — SIGNIFICANT CHANGE UP (ref 0.5–1.3)
EGFR: 117 ML/MIN/1.73M2 — SIGNIFICANT CHANGE UP
EOSINOPHIL # BLD AUTO: 0 K/UL — SIGNIFICANT CHANGE UP (ref 0–0.5)
EOSINOPHIL NFR BLD AUTO: 0 % — SIGNIFICANT CHANGE UP (ref 0–6)
GLUCOSE SERPL-MCNC: 203 MG/DL — HIGH (ref 70–99)
HCT VFR BLD CALC: 49.8 % — HIGH (ref 34.5–45)
HGB BLD-MCNC: 16.2 G/DL — HIGH (ref 11.5–15.5)
IMM GRANULOCYTES NFR BLD AUTO: 0.5 % — SIGNIFICANT CHANGE UP (ref 0–0.9)
LYMPHOCYTES # BLD AUTO: 0.73 K/UL — LOW (ref 1–3.3)
LYMPHOCYTES # BLD AUTO: 17.2 % — SIGNIFICANT CHANGE UP (ref 13–44)
MAGNESIUM SERPL-MCNC: 2.1 MG/DL — SIGNIFICANT CHANGE UP (ref 1.6–2.6)
MCHC RBC-ENTMCNC: 28.3 PG — SIGNIFICANT CHANGE UP (ref 27–34)
MCHC RBC-ENTMCNC: 32.5 GM/DL — SIGNIFICANT CHANGE UP (ref 32–36)
MCV RBC AUTO: 86.9 FL — SIGNIFICANT CHANGE UP (ref 80–100)
MONOCYTES # BLD AUTO: 0.02 K/UL — SIGNIFICANT CHANGE UP (ref 0–0.9)
MONOCYTES NFR BLD AUTO: 0.5 % — LOW (ref 2–14)
NEUTROPHILS # BLD AUTO: 3.46 K/UL — SIGNIFICANT CHANGE UP (ref 1.8–7.4)
NEUTROPHILS NFR BLD AUTO: 81.6 % — HIGH (ref 43–77)
NRBC # BLD: 0 /100 WBCS — SIGNIFICANT CHANGE UP (ref 0–0)
PHOSPHATE SERPL-MCNC: 2.5 MG/DL — SIGNIFICANT CHANGE UP (ref 2.5–4.5)
PLATELET # BLD AUTO: 332 K/UL — SIGNIFICANT CHANGE UP (ref 150–400)
POTASSIUM SERPL-MCNC: 3.9 MMOL/L — SIGNIFICANT CHANGE UP (ref 3.5–5.3)
POTASSIUM SERPL-SCNC: 3.9 MMOL/L — SIGNIFICANT CHANGE UP (ref 3.5–5.3)
PROT SERPL-MCNC: 8.2 G/DL — SIGNIFICANT CHANGE UP (ref 6–8.3)
RBC # BLD: 5.73 M/UL — HIGH (ref 3.8–5.2)
RBC # FLD: 12.2 % — SIGNIFICANT CHANGE UP (ref 10.3–14.5)
SODIUM SERPL-SCNC: 137 MMOL/L — SIGNIFICANT CHANGE UP (ref 135–145)
TSH SERPL-MCNC: 0.65 UIU/ML — SIGNIFICANT CHANGE UP (ref 0.27–4.2)
WBC # BLD: 4.24 K/UL — SIGNIFICANT CHANGE UP (ref 3.8–10.5)
WBC # FLD AUTO: 4.24 K/UL — SIGNIFICANT CHANGE UP (ref 3.8–10.5)

## 2024-09-10 PROCEDURE — 99254 IP/OBS CNSLTJ NEW/EST MOD 60: CPT

## 2024-09-10 PROCEDURE — 99232 SBSQ HOSP IP/OBS MODERATE 35: CPT

## 2024-09-10 RX ORDER — ENOXAPARIN SODIUM 100 MG/ML
40 INJECTION SUBCUTANEOUS EVERY 24 HOURS
Refills: 0 | Status: DISCONTINUED | OUTPATIENT
Start: 2024-09-10 | End: 2024-09-13

## 2024-09-10 RX ORDER — FLU VACCINE TS 2012-2013(5YR+) 45MCG/.5ML
0.5 VIAL (ML) INTRAMUSCULAR ONCE
Refills: 0 | Status: DISCONTINUED | OUTPATIENT
Start: 2024-09-10 | End: 2024-09-13

## 2024-09-10 RX ADMIN — Medication 50 MILLIGRAM(S): at 00:00

## 2024-09-10 RX ADMIN — Medication 40 MILLIGRAM(S): at 06:30

## 2024-09-10 RX ADMIN — Medication 50 MILLIGRAM(S): at 22:30

## 2024-09-10 NOTE — PHYSICAL THERAPY INITIAL EVALUATION ADULT - MUSCLE TONE ASSESSMENT, REHAB EVAL
Pt has been given results. Referral has been placed. Pt expressed clear understanding and had no further questions. normal

## 2024-09-10 NOTE — PHYSICAL THERAPY INITIAL EVALUATION ADULT - GENERAL OBSERVATIONS, REHAB EVAL
PT IE Completed. Pt received semi-supine in bed, NAD, +hep-lock, +CB. Cleared for PT by CAROL Neff. D/C PT as pt is independent w/ all mobility, ADLs, IADLs. Pt left as received all lines intact, needs met and within reach, RN aware.

## 2024-09-10 NOTE — PHYSICAL THERAPY INITIAL EVALUATION ADULT - GAIT DEVIATIONS NOTED, PT EVAL
pt demos good singh and steadiness however demos slightly decreased L toe clearance despite MMT 5/5 bilaterally

## 2024-09-10 NOTE — PHYSICAL THERAPY INITIAL EVALUATION ADULT - PATIENT/FAMILY AGREES WITH PLAN
yes Keshawn Neely  CARDIOVASCULAR DISEASE  [address], [address]  [place] [address]  Phone: [phone]  Fax: [phone]  Established Patient  Follow Up Time: 1 week    Dillon Terrell)  Gastroenterology; Internal Medicine  [address], [address] [place] [address]  Phone: [phone]  Fax: [phone]  Established Patient  Follow Up Time: 2 weeks

## 2024-09-10 NOTE — PHYSICAL THERAPY INITIAL EVALUATION ADULT - MODALITIES TREATMENT COMMENTS
Pt performed heel walking and toe walking w/ mild unsteadiness. Pt only demos decreased L toe clearance during regular ambulation.

## 2024-09-10 NOTE — OCCUPATIONAL THERAPY INITIAL EVALUATION ADULT - ADDITIONAL COMMENTS
Pt lives in an apt with no JAYDA +12 steps inside. Pt was indep PTA with ADLs and functional mobility without AD. Pt works as a nurse.

## 2024-09-10 NOTE — OCCUPATIONAL THERAPY INITIAL EVALUATION ADULT - GENERAL OBSERVATIONS, REHAB EVAL
Pt received seated in bed +heplock, in NAD and agreeable to OT. PT Ronda present t/o session. Cleared by CAROL Gayle and MD Black to see pt.

## 2024-09-10 NOTE — OCCUPATIONAL THERAPY INITIAL EVALUATION ADULT - PERTINENT HX OF CURRENT PROBLEM, REHAB EVAL
24-year-old female with a PMHx of MS, who presents for 1 week of new left leg dragging/incoordination concerning for a relapse. The patient was diagnosed with MS in August 2024, following an encounter for right optic neuritis in July (vision returned fully back to baseline). Brain MRI at the time was typical for MS with periventricular and juxtacortical T2 lesions. CSF specific oligoclonal bands were present. The patient states that she feels as if the leg is weak, and that if she puts weight on it, it might "give out", but she denies any numbness or tingling. She also denies falls, as well as urinary, respiratory, and infectious symptoms. She also denies vision loss, vertigo, and loss of consciousness.

## 2024-09-10 NOTE — OCCUPATIONAL THERAPY INITIAL EVALUATION ADULT - DIAGNOSIS, OT EVAL
pt admitted for left leg weakness. Pt with mildly decreased coordination in the L leg during ambulation however L leg strength intact at this time. Pt able to perform all ADLs and functional mobility independently and safely. No further acute OT needs, pt will be d/c from OT at this time.

## 2024-09-10 NOTE — PHYSICAL THERAPY INITIAL EVALUATION ADULT - ADDITIONAL COMMENTS
Pt lives in apt w/ roommates, [address]. PTA pt independent w/ all mobility, ADLs, IADLs, working as an RN.

## 2024-09-10 NOTE — PROGRESS NOTE ADULT - ASSESSMENT
The patient is a 24-year-old female with a PMHx of MS, who presents for 1 week of new left leg dragging/incoordination concerning for a relapse. Admitted for IV methylprednisolone treatment and MRI brain/spine to further characterize the etiology of her symptoms and treat for likely MS relapse.     Plan:    #MS  #LE incoordination, symptoms now improving.   - C/w methylprednisolone 1g IV x 3 days .   - Follow MR brain w/w/out, and MR C/T spines w/w/out  - C/w Protonix 40mg PO q24 hours  - PT/OT Consulted  - Fall precautions  - Plan to start Ocrevus as an outpatient post-admission on 9/14.     #ppx:  F: NI  E: Replete as needed; K<4.0, Mg<2.0, Phos<2.5  N: Regular diet  GI ppx: Protonix  DVT ppx: SCDs and Lovenox  Code: Full  Dispo: [facility] - Neuro service

## 2024-09-10 NOTE — PROGRESS NOTE ADULT - SUBJECTIVE AND OBJECTIVE BOX
---------Neurology Progress Note--------    Interval History:    Pt admitted overnight and started IV steroids. No acute events. States her leg weakness is better today. Denies any blurry vision, pain with eye movement, new sensory or motor changes, urinary issues, fever, chills.    Vital Signs Last 24 Hrs  T(C): 36.6 (10 Sep 2024 05:27), Max: 36.9 (09 Sep 2024 19:10)  T(F): 97.9 (10 Sep 2024 05:27), Max: 98.5 (09 Sep 2024 19:10)  HR: 85 (10 Sep 2024 05:27) (63 - 85)  BP: 127/52 (10 Sep 2024 05:27) (125/85 - 131/83)  BP(mean): 777 (10 Sep 2024 05:27) (777 - 777)  RR: 17 (10 Sep 2024 05:27) (16 - 17)  SpO2: 97% (10 Sep 2024 05:27) (95% - 99%)    Parameters below as of 10 Sep 2024 05:27  Patient On (Oxygen Delivery Method): room air        Neurological Examination:  General:  Appearance is consistent with chronologic age.   Cognitive/Language:  Awake, alert, and oriented to person, place, time and date.  Recent and remote memory intact.  Fund of knowledge is appropriate.  Naming, repetition and comprehension intact. Nondysarthric.    Cranial Nerves  - Eyes: Visual acuity intact, Visual fields full.  EOMI w/o nystagmus, skew or reported double vision.  PERRL.  No ptosis/weakness of eyelid closure.    - Face: Facial sensation normal V1 - 3, no facial asymmetry.    - Ears/Nose/Throat:  Hearing grossly intact b/l to finger rub.  Palate elevates midline.  Tongue and uvula midline.   Motor exam: Normal tone and bulk. No tenderness, twitching, tremors or involuntary movements.   - MRC grading:          Upper extremity                  Bicep     Tricep     HG                                                 R      5/5        5/5          5/5                                                    L       5/5        5/5          5/5              Lower extremity                   HF        KE        DF         PF                                                  R     5/5       5/5       5/5       5/5                                               L      5/5      5/5       5/5        5/5  Sensory examination:  Intact to light touch and pinprick, pain, temperature and proprioception and vibration in all extremities.  Reflexes: 2+ b/l biceps, triceps and achilles. 3+left patella with spread to right adductor, 3+ R patella,  Plantar response downgoing in right foot, mute in left.   Cerebellum: FTN/HKS intact.  No dysmetria.    Gait narrow based and normal.      Labs:  CBC Full  -  ( 10 Sep 2024 05:30 )  WBC Count : 4.24 K/uL  RBC Count : 5.73 M/uL  Hemoglobin : 16.2 g/dL  Hematocrit : 49.8 %  Platelet Count - Automated : 332 K/uL  Mean Cell Volume : 86.9 fl  Mean Cell Hemoglobin : 28.3 pg  Mean Cell Hemoglobin Concentration : 32.5 gm/dL  Auto Neutrophil # : 3.46 K/uL  Auto Lymphocyte # : 0.73 K/uL  Auto Monocyte # : 0.02 K/uL  Auto Eosinophil # : 0.00 K/uL  Auto Basophil # : 0.01 K/uL  Auto Neutrophil % : 81.6 %  Auto Lymphocyte % : 17.2 %  Auto Monocyte % : 0.5 %  Auto Eosinophil % : 0.0 %  Auto Basophil % : 0.2 %    09-10    137  |  102  |  8   ----------------------------<  203<H>  3.9   |  21<L>  |  0.73    Ca    10.0      10 Sep 2024 05:30  Phos  2.5     09-10  Mg     2.1     09-10    TPro  8.2  /  Alb  4.5  /  TBili  0.4  /  DBili  x   /  AST  17  /  ALT  21  /  AlkPhos  73  09-10    LIVER FUNCTIONS - ( 10 Sep 2024 05:30 )  Alb: 4.5 g/dL / Pro: 8.2 g/dL / ALK PHOS: 73 U/L / ALT: 21 U/L / AST: 17 U/L / GGT: x             Urinalysis Basic - ( 10 Sep 2024 05:30 )    Color: x / Appearance: x / SG: x / pH: x  Gluc: 203 mg/dL / Ketone: x  / Bili: x / Urobili: x   Blood: x / Protein: x / Nitrite: x   Leuk Esterase: x / RBC: x / WBC x   Sq Epi: x / Non Sq Epi: x / Bacteria: x        Radiology and Other Testings:    Medications:  acetaminophen     Tablet .. 650 milliGRAM(s) Oral every 6 hours PRN  enoxaparin Injectable 40 milliGRAM(s) SubCutaneous once  influenza   Vaccine 0.5 milliLiter(s) IntraMuscular once  LORazepam     Tablet 0.5 milliGRAM(s) Oral once PRN  methylPREDNISolone sodium succinate IVPB 1000 milliGRAM(s) IV Intermittent every 24 hours  pantoprazole    Tablet 40 milliGRAM(s) Oral before breakfast

## 2024-09-11 LAB
ALBUMIN SERPL ELPH-MCNC: 4 G/DL — SIGNIFICANT CHANGE UP (ref 3.3–5)
ALP SERPL-CCNC: 66 U/L — SIGNIFICANT CHANGE UP (ref 40–120)
ALT FLD-CCNC: 15 U/L — SIGNIFICANT CHANGE UP (ref 10–45)
ANION GAP SERPL CALC-SCNC: 12 MMOL/L — SIGNIFICANT CHANGE UP (ref 5–17)
AST SERPL-CCNC: 11 U/L — SIGNIFICANT CHANGE UP (ref 10–40)
BASOPHILS # BLD AUTO: 0.01 K/UL — SIGNIFICANT CHANGE UP (ref 0–0.2)
BASOPHILS NFR BLD AUTO: 0.1 % — SIGNIFICANT CHANGE UP (ref 0–2)
BILIRUB SERPL-MCNC: 0.2 MG/DL — SIGNIFICANT CHANGE UP (ref 0.2–1.2)
BUN SERPL-MCNC: 8 MG/DL — SIGNIFICANT CHANGE UP (ref 7–23)
CALCIUM SERPL-MCNC: 9.4 MG/DL — SIGNIFICANT CHANGE UP (ref 8.4–10.5)
CHLORIDE SERPL-SCNC: 107 MMOL/L — SIGNIFICANT CHANGE UP (ref 96–108)
CO2 SERPL-SCNC: 19 MMOL/L — LOW (ref 22–31)
CREAT SERPL-MCNC: 0.69 MG/DL — SIGNIFICANT CHANGE UP (ref 0.5–1.3)
EGFR: 123 ML/MIN/1.73M2 — SIGNIFICANT CHANGE UP
EOSINOPHIL # BLD AUTO: 0 K/UL — SIGNIFICANT CHANGE UP (ref 0–0.5)
EOSINOPHIL NFR BLD AUTO: 0 % — SIGNIFICANT CHANGE UP (ref 0–6)
FOLATE SERPL-MCNC: >20 NG/ML — SIGNIFICANT CHANGE UP
GLUCOSE SERPL-MCNC: 162 MG/DL — HIGH (ref 70–99)
HCT VFR BLD CALC: 42.9 % — SIGNIFICANT CHANGE UP (ref 34.5–45)
HGB BLD-MCNC: 14.1 G/DL — SIGNIFICANT CHANGE UP (ref 11.5–15.5)
IMM GRANULOCYTES NFR BLD AUTO: 0.4 % — SIGNIFICANT CHANGE UP (ref 0–0.9)
LYMPHOCYTES # BLD AUTO: 1.03 K/UL — SIGNIFICANT CHANGE UP (ref 1–3.3)
LYMPHOCYTES # BLD AUTO: 7.6 % — LOW (ref 13–44)
MAGNESIUM SERPL-MCNC: 2.1 MG/DL — SIGNIFICANT CHANGE UP (ref 1.6–2.6)
MCHC RBC-ENTMCNC: 27.5 PG — SIGNIFICANT CHANGE UP (ref 27–34)
MCHC RBC-ENTMCNC: 32.9 GM/DL — SIGNIFICANT CHANGE UP (ref 32–36)
MCV RBC AUTO: 83.8 FL — SIGNIFICANT CHANGE UP (ref 80–100)
MONOCYTES # BLD AUTO: 0.07 K/UL — SIGNIFICANT CHANGE UP (ref 0–0.9)
MONOCYTES NFR BLD AUTO: 0.5 % — LOW (ref 2–14)
NEUTROPHILS # BLD AUTO: 12.47 K/UL — HIGH (ref 1.8–7.4)
NEUTROPHILS NFR BLD AUTO: 91.4 % — HIGH (ref 43–77)
NRBC # BLD: 0 /100 WBCS — SIGNIFICANT CHANGE UP (ref 0–0)
PHOSPHATE SERPL-MCNC: 3.1 MG/DL — SIGNIFICANT CHANGE UP (ref 2.5–4.5)
PLATELET # BLD AUTO: 328 K/UL — SIGNIFICANT CHANGE UP (ref 150–400)
POTASSIUM SERPL-MCNC: 4.3 MMOL/L — SIGNIFICANT CHANGE UP (ref 3.5–5.3)
POTASSIUM SERPL-SCNC: 4.3 MMOL/L — SIGNIFICANT CHANGE UP (ref 3.5–5.3)
PROT SERPL-MCNC: 7.2 G/DL — SIGNIFICANT CHANGE UP (ref 6–8.3)
RBC # BLD: 5.12 M/UL — SIGNIFICANT CHANGE UP (ref 3.8–5.2)
RBC # FLD: 12.2 % — SIGNIFICANT CHANGE UP (ref 10.3–14.5)
SODIUM SERPL-SCNC: 138 MMOL/L — SIGNIFICANT CHANGE UP (ref 135–145)
VIT B12 SERPL-MCNC: 355 PG/ML — SIGNIFICANT CHANGE UP (ref 232–1245)
WBC # BLD: 13.63 K/UL — HIGH (ref 3.8–10.5)
WBC # FLD AUTO: 13.63 K/UL — HIGH (ref 3.8–10.5)

## 2024-09-11 PROCEDURE — 99232 SBSQ HOSP IP/OBS MODERATE 35: CPT

## 2024-09-11 PROCEDURE — 72156 MRI NECK SPINE W/O & W/DYE: CPT | Mod: 26

## 2024-09-11 PROCEDURE — 70553 MRI BRAIN STEM W/O & W/DYE: CPT | Mod: 26

## 2024-09-11 PROCEDURE — 72157 MRI CHEST SPINE W/O & W/DYE: CPT | Mod: 26

## 2024-09-11 RX ORDER — METHYLPREDNISOLONE 4 MG
1000 TABLET ORAL EVERY 24 HOURS
Refills: 0 | Status: DISCONTINUED | OUTPATIENT
Start: 2024-09-11 | End: 2024-09-12

## 2024-09-11 RX ADMIN — Medication 50 MILLIGRAM(S): at 19:25

## 2024-09-11 RX ADMIN — Medication 40 MILLIGRAM(S): at 06:51

## 2024-09-11 RX ADMIN — LORAZEPAM 0.5 MILLIGRAM(S): 4 INJECTION INTRAMUSCULAR; INTRAVENOUS at 16:50

## 2024-09-11 NOTE — DIETITIAN INITIAL EVALUATION ADULT - PERTINENT MEDS FT
MEDICATIONS  (STANDING):  enoxaparin Injectable 40 milliGRAM(s) SubCutaneous every 24 hours  influenza   Vaccine 0.5 milliLiter(s) IntraMuscular once  methylPREDNISolone sodium succinate IVPB 1000 milliGRAM(s) IV Intermittent every 24 hours  pantoprazole    Tablet 40 milliGRAM(s) Oral before breakfast    MEDICATIONS  (PRN):  acetaminophen     Tablet .. 650 milliGRAM(s) Oral every 6 hours PRN Temp greater or equal to 38C (100.4F), Mild Pain (1 - 3), Moderate Pain (4 - 6)  LORazepam     Tablet 0.5 milliGRAM(s) Oral once PRN Anxiety

## 2024-09-11 NOTE — DIETITIAN INITIAL EVALUATION ADULT - PERTINENT LABORATORY DATA
09-11    138  |  107  |  8   ----------------------------<  162<H>  4.3   |  19<L>  |  0.69    Ca    9.4      11 Sep 2024 05:30  Phos  3.1     09-11  Mg     2.1     09-11    TPro  7.2  /  Alb  4.0  /  TBili  0.2  /  DBili  x   /  AST  11  /  ALT  15  /  AlkPhos  66  09-11

## 2024-09-11 NOTE — PROGRESS NOTE ADULT - ASSESSMENT
*** INCOMPLETE NOTE ***     The patient is a 24-year-old female with a PMHx of MS, who presents for 1 week of new left leg dragging/incoordination concerning for a relapse. Admitted for IV methylprednisolone treatment and MRI brain/spine to further characterize the etiology of her symptoms and treat for likely MS relapse.     Plan:    #MS  #LE incoordination, symptoms now improving.   - C/w methylprednisolone 1g IV x 3 days .   - Follow MR brain w/w/out, and MR C/T spines w/w/out  - C/w Protonix 40mg PO q24 hours  - PT/OT Consulted  - Fall precautions  - Plan to start Ocrevus as an outpatient post-admission on 9/14.     #ppx:  F: NI  E: Replete as needed; K<4.0, Mg<2.0, Phos<2.5  N: Regular diet  GI ppx: Protonix  DVT ppx: SCDs and Lovenox  Code: Full  Dispo: [facility] - Neuro service The patient is a 24-year-old female with a PMHx of MS, who presents for 1 week of new left leg dragging/incoordination concerning for a relapse. Admitted for IV methylprednisolone treatment and MRI brain/spine to further characterize the etiology of her symptoms and treat for likely MS relapse.     Plan:    #MS  #LE incoordination, symptoms now improving.   - C/w methylprednisolone 1g IV x 5 days, pt to receive her 3rd dose tonight ~ 1830.   - Follow MR brain w/w/out, and MR C/T spines w/w/out. Tentatively scheduled for 9/11/2024   - C/w Protonix 40mg PO q24 hours  - PT/OT Consulted  - Fall precautions  - Plan to start Ocrevus as an outpatient post-admission on 9/14.     #ppx:  F: NI  E: Replete as needed; K<4.0, Mg<2.0, Phos<2.5  N: Regular diet  GI ppx: Protonix  DVT ppx: SCDs and Lovenox  Code: Full  Dispo: [facility] - Neuro service

## 2024-09-11 NOTE — DIETITIAN INITIAL EVALUATION ADULT - OTHER INFO
24F with PM of MS who presented with x1 week new left leg dragging/incoordination concerning for relapse, admitted for management. Pending MRI brain/spine.     Pt seen on 7WO for assessment. Labs and medication orders reviewed. Ordered for IV solumedrol. Electrolytes WNL. On Regular lacto-ovo vegetarian diet. Pt reports good appetite and intake. Pt denies difficulty chewing/swallowing. Endorses wt stability PTA. RD observed pt with no overt signs of wasting. Pt denies nausea/vomiting/diarrhea/constipation. Confirms no known food allergies. Endorses lacto-ovo vegetarian diet preference, no other Shinto/ethnic/cultural food preferences noted. No pressure injuries or edema documented, Jairo score 23. See nutrition recommendations. RD to remain available.

## 2024-09-11 NOTE — PROGRESS NOTE ADULT - SUBJECTIVE AND OBJECTIVE BOX
Neurology Progress Note    INTERVAL HPI/OVERNIGHT EVENTS:  Patient seen and examined.     MEDICATIONS  (STANDING):  enoxaparin Injectable 40 milliGRAM(s) SubCutaneous every 24 hours  influenza   Vaccine 0.5 milliLiter(s) IntraMuscular once  methylPREDNISolone sodium succinate IVPB 1000 milliGRAM(s) IV Intermittent every 24 hours  pantoprazole    Tablet 40 milliGRAM(s) Oral before breakfast    MEDICATIONS  (PRN):  acetaminophen     Tablet .. 650 milliGRAM(s) Oral every 6 hours PRN Temp greater or equal to 38C (100.4F), Mild Pain (1 - 3), Moderate Pain (4 - 6)  LORazepam     Tablet 0.5 milliGRAM(s) Oral once PRN Anxiety      Allergies    No Known Allergies    Intolerances        ROS: As per HPI, otherwise negative    Vital Signs Last 24 Hrs  T(C): 36.7 (11 Sep 2024 05:45), Max: 36.7 (11 Sep 2024 05:45)  T(F): 98.1 (11 Sep 2024 05:45), Max: 98.1 (11 Sep 2024 05:45)  HR: 74 (11 Sep 2024 05:45) (66 - 101)  BP: 102/51 (11 Sep 2024 05:45) (102/51 - 123/87)  BP(mean): 68 (11 Sep 2024 05:45) (68 - 68)  RR: 16 (11 Sep 2024 05:45) (16 - 18)  SpO2: 97% (11 Sep 2024 05:45) (96% - 98%)    Parameters below as of 11 Sep 2024 05:45  Patient On (Oxygen Delivery Method): room air        Physical exam:  General: awake and alert, sitting comfortably, no acute distress    Neurologic:  Mental status: awake, alert, oriented to person, place, and time. Speech is fluent, able to name objects. Follows commands. Attention/concentration intact. No dysarthria, no aphasia.  Cranial nerves:   II: visual fields are full to confrontation. pupils equally round and reactive to light,   III, IV, VI: EOMI without nystagmus  V:  V1-V3 sensation intact,   VII: no facial droop, facie is symmetric with normal eye closure and smile  VIII: hearing is intact to finger rub  IX, X: Uvula is midline and soft palate rises symmetrically  XI: head turning and shoulder shrug are intact.  XII: tongue midline  Motor: Normal bulk and tone, strength 5/5 in b/l UE and LE,  strength 5/5. No pronator drift  Sensation: intact to light touch. No neglect.  Coordination: No dysmetria on finger-to-nose and heel-to-shin  Reflexes: downgoing toes bilaterally  Gait: narrow and steady, no ataxia. Romberg negative        LABS:                        14.1   13.63 )-----------( 328      ( 11 Sep 2024 05:30 )             42.9     09-11    138  |  107  |  8   ----------------------------<  162<H>  4.3   |  19<L>  |  0.69    Ca    9.4      11 Sep 2024 05:30  Phos  3.1     09-11  Mg     2.1     09-11    TPro  7.2  /  Alb  4.0  /  TBili  0.2  /  DBili  x   /  AST  11  /  ALT  15  /  AlkPhos  66  09-11      Urinalysis Basic - ( 11 Sep 2024 05:30 )    Color: x / Appearance: x / SG: x / pH: x  Gluc: 162 mg/dL / Ketone: x  / Bili: x / Urobili: x   Blood: x / Protein: x / Nitrite: x   Leuk Esterase: x / RBC: x / WBC x   Sq Epi: x / Non Sq Epi: x / Bacteria: x        RADIOLOGY & ADDITIONAL TESTS: Neurology Progress Note    INTERVAL HPI/OVERNIGHT EVENTS:  Patient seen and examined. Pt in good spirits. States her weakness has improved. When asked if pt had obligations that would prevent/hinder further inpatient treatment, pt reports to have called out of work to receive the best care possible. Pt's questions answered alongside father at bedside.     MEDICATIONS  (STANDING):  enoxaparin Injectable 40 milliGRAM(s) SubCutaneous every 24 hours  influenza   Vaccine 0.5 milliLiter(s) IntraMuscular once  methylPREDNISolone sodium succinate IVPB 1000 milliGRAM(s) IV Intermittent every 24 hours  pantoprazole    Tablet 40 milliGRAM(s) Oral before breakfast    MEDICATIONS  (PRN):  acetaminophen     Tablet .. 650 milliGRAM(s) Oral every 6 hours PRN Temp greater or equal to 38C (100.4F), Mild Pain (1 - 3), Moderate Pain (4 - 6)  LORazepam     Tablet 0.5 milliGRAM(s) Oral once PRN Anxiety      Allergies    No Known Allergies    Intolerances        ROS: As per HPI, otherwise negative    Vital Signs Last 24 Hrs  T(C): 36.7 (11 Sep 2024 05:45), Max: 36.7 (11 Sep 2024 05:45)  T(F): 98.1 (11 Sep 2024 05:45), Max: 98.1 (11 Sep 2024 05:45)  HR: 74 (11 Sep 2024 05:45) (66 - 101)  BP: 102/51 (11 Sep 2024 05:45) (102/51 - 123/87)  BP(mean): 68 (11 Sep 2024 05:45) (68 - 68)  RR: 16 (11 Sep 2024 05:45) (16 - 18)  SpO2: 97% (11 Sep 2024 05:45) (96% - 98%)    Parameters below as of 11 Sep 2024 05:45  Patient On (Oxygen Delivery Method): room air        Physical exam:  General: awake and alert, sitting comfortably, no acute distress    Neurologic:  Mental status: awake, alert, oriented to person, place, and time. Speech is fluent, able to name objects. Follows commands. Attention/concentration intact. No dysarthria, no aphasia.  Cranial nerves:   II: visual fields are full to confrontation. pupils equally round and reactive to light,   III, IV, VI: EOMI without nystagmus  V:  V1-V3 sensation intact,   VII: no facial droop, facie is symmetric with normal eye closure and smile  VIII: hearing is intact to finger rub  IX, X: Uvula is midline and soft palate rises symmetrically  XI: head turning and shoulder shrug are intact.  XII: tongue midline  Motor: Normal bulk and tone, strength 5/5 in b/l UE and LE,  strength 5/5. No pronator drift  Sensation: intact to light touch. No neglect.  Coordination: No dysmetria on finger-to-nose and heel-to-shin  Reflexes: downgoing toes bilaterally, + Hyperreflexive 3/5 L achilles, patellar, and forearm reflexes + Posada sign on L 3rd digit   Gait: narrow and steady, no ataxia. Romberg negative        LABS:                        14.1   13.63 )-----------( 328      ( 11 Sep 2024 05:30 )             42.9     09-11    138  |  107  |  8   ----------------------------<  162<H>  4.3   |  19<L>  |  0.69    Ca    9.4      11 Sep 2024 05:30  Phos  3.1     09-11  Mg     2.1     09-11    TPro  7.2  /  Alb  4.0  /  TBili  0.2  /  DBili  x   /  AST  11  /  ALT  15  /  AlkPhos  66  09-11      Urinalysis Basic - ( 11 Sep 2024 05:30 )    Color: x / Appearance: x / SG: x / pH: x  Gluc: 162 mg/dL / Ketone: x  / Bili: x / Urobili: x   Blood: x / Protein: x / Nitrite: x   Leuk Esterase: x / RBC: x / WBC x   Sq Epi: x / Non Sq Epi: x / Bacteria: x        RADIOLOGY & ADDITIONAL TESTS:

## 2024-09-12 LAB
ALBUMIN SERPL ELPH-MCNC: 4.2 G/DL — SIGNIFICANT CHANGE UP (ref 3.3–5)
ALP SERPL-CCNC: 69 U/L — SIGNIFICANT CHANGE UP (ref 40–120)
ALT FLD-CCNC: 15 U/L — SIGNIFICANT CHANGE UP (ref 10–45)
ANION GAP SERPL CALC-SCNC: 11 MMOL/L — SIGNIFICANT CHANGE UP (ref 5–17)
AST SERPL-CCNC: 13 U/L — SIGNIFICANT CHANGE UP (ref 10–40)
BASOPHILS # BLD AUTO: 0.01 K/UL — SIGNIFICANT CHANGE UP (ref 0–0.2)
BASOPHILS NFR BLD AUTO: 0.1 % — SIGNIFICANT CHANGE UP (ref 0–2)
BILIRUB SERPL-MCNC: 0.2 MG/DL — SIGNIFICANT CHANGE UP (ref 0.2–1.2)
BUN SERPL-MCNC: 8 MG/DL — SIGNIFICANT CHANGE UP (ref 7–23)
CALCIUM SERPL-MCNC: 9.4 MG/DL — SIGNIFICANT CHANGE UP (ref 8.4–10.5)
CHLORIDE SERPL-SCNC: 103 MMOL/L — SIGNIFICANT CHANGE UP (ref 96–108)
CO2 SERPL-SCNC: 24 MMOL/L — SIGNIFICANT CHANGE UP (ref 22–31)
CREAT SERPL-MCNC: 0.7 MG/DL — SIGNIFICANT CHANGE UP (ref 0.5–1.3)
EGFR: 123 ML/MIN/1.73M2 — SIGNIFICANT CHANGE UP
EOSINOPHIL # BLD AUTO: 0.01 K/UL — SIGNIFICANT CHANGE UP (ref 0–0.5)
EOSINOPHIL NFR BLD AUTO: 0.1 % — SIGNIFICANT CHANGE UP (ref 0–6)
GLUCOSE SERPL-MCNC: 139 MG/DL — HIGH (ref 70–99)
HCT VFR BLD CALC: 45.8 % — HIGH (ref 34.5–45)
HGB BLD-MCNC: 14.7 G/DL — SIGNIFICANT CHANGE UP (ref 11.5–15.5)
IMM GRANULOCYTES NFR BLD AUTO: 0.5 % — SIGNIFICANT CHANGE UP (ref 0–0.9)
LYMPHOCYTES # BLD AUTO: 1.1 K/UL — SIGNIFICANT CHANGE UP (ref 1–3.3)
LYMPHOCYTES # BLD AUTO: 7.5 % — LOW (ref 13–44)
MAGNESIUM SERPL-MCNC: 2.2 MG/DL — SIGNIFICANT CHANGE UP (ref 1.6–2.6)
MCHC RBC-ENTMCNC: 27.5 PG — SIGNIFICANT CHANGE UP (ref 27–34)
MCHC RBC-ENTMCNC: 32.1 GM/DL — SIGNIFICANT CHANGE UP (ref 32–36)
MCV RBC AUTO: 85.8 FL — SIGNIFICANT CHANGE UP (ref 80–100)
MONOCYTES # BLD AUTO: 0.48 K/UL — SIGNIFICANT CHANGE UP (ref 0–0.9)
MONOCYTES NFR BLD AUTO: 3.3 % — SIGNIFICANT CHANGE UP (ref 2–14)
NEUTROPHILS # BLD AUTO: 13.09 K/UL — HIGH (ref 1.8–7.4)
NEUTROPHILS NFR BLD AUTO: 88.5 % — HIGH (ref 43–77)
NRBC # BLD: 0 /100 WBCS — SIGNIFICANT CHANGE UP (ref 0–0)
PHOSPHATE SERPL-MCNC: 3.7 MG/DL — SIGNIFICANT CHANGE UP (ref 2.5–4.5)
PLATELET # BLD AUTO: 329 K/UL — SIGNIFICANT CHANGE UP (ref 150–400)
POTASSIUM SERPL-MCNC: 3.9 MMOL/L — SIGNIFICANT CHANGE UP (ref 3.5–5.3)
POTASSIUM SERPL-SCNC: 3.9 MMOL/L — SIGNIFICANT CHANGE UP (ref 3.5–5.3)
PROT SERPL-MCNC: 7.6 G/DL — SIGNIFICANT CHANGE UP (ref 6–8.3)
RBC # BLD: 5.34 M/UL — HIGH (ref 3.8–5.2)
RBC # FLD: 12.4 % — SIGNIFICANT CHANGE UP (ref 10.3–14.5)
SODIUM SERPL-SCNC: 138 MMOL/L — SIGNIFICANT CHANGE UP (ref 135–145)
WBC # BLD: 14.76 K/UL — HIGH (ref 3.8–10.5)
WBC # FLD AUTO: 14.76 K/UL — HIGH (ref 3.8–10.5)

## 2024-09-12 PROCEDURE — 99233 SBSQ HOSP IP/OBS HIGH 50: CPT

## 2024-09-12 PROCEDURE — 99232 SBSQ HOSP IP/OBS MODERATE 35: CPT

## 2024-09-12 RX ORDER — METHYLPREDNISOLONE 4 MG
1000 TABLET ORAL EVERY 24 HOURS
Refills: 0 | Status: DISCONTINUED | OUTPATIENT
Start: 2024-09-12 | End: 2024-09-13

## 2024-09-12 RX ADMIN — Medication 50 MILLIGRAM(S): at 13:42

## 2024-09-12 RX ADMIN — Medication 40 MILLIGRAM(S): at 07:06

## 2024-09-12 NOTE — PROGRESS NOTE ADULT - ASSESSMENT
*** INCOMPLETE NOTE ***    The patient is a 24-year-old female with a PMHx of MS, who presents for 1 week of new left leg dragging/incoordination concerning for a relapse. Admitted for IV methylprednisolone treatment and MRI brain/spine to further characterize the etiology of her symptoms and treat for likely MS relapse.     Plan:    #MS  #LE incoordination, symptoms now improving.   - C/w methylprednisolone 1g IV x 5 days, pt to receive her 3rd dose tonight ~ 1830.   - Follow MR brain w/w/out, and MR C/T spines w/w/out. Tentatively scheduled for 9/11/2024   - C/w Protonix 40mg PO q24 hours  - PT/OT Consulted  - Fall precautions  - Plan to start Ocrevus as an outpatient post-admission on 9/14.     #ppx:  F: NI  E: Replete as needed; K<4.0, Mg<2.0, Phos<2.5  N: Regular diet  GI ppx: Protonix  DVT ppx: SCDs and Lovenox  Code: Full  Dispo: [facility] - Neuro service The patient is a 24-year-old female with a PMHx of MS, who presents for 1 week of new left leg dragging/incoordination concerning for a relapse.  MRI brain shows new lesions in left cerebellar peduncle otherwise no lesions on the spine.  Currently on IV methylprednisolone. Plan for 5 days of IV steroids and to start Ocrevus infusion outpatient.     Plan:    #MS  #LE incoordination, symptoms now improving.   - C/w methylprednisolone 1g IV x 5 days (9/9-9/13)  - MRI brain and spine as above   - C/w Protonix 40mg PO q24 hours  - PT/OT Consulted  - Fall precautions  - Plan to start Ocrevus as an outpatient post-admission on 9/14.     #ppx:  F: NI  E: Replete as needed; K<4.0, Mg<2.0, Phos<2.5  N: Regular diet  GI ppx: Protonix  DVT ppx: SCDs and Lovenox  Code: Full  Dispo: Neuro service

## 2024-09-12 NOTE — PROGRESS NOTE ADULT - ATTENDING COMMENTS
MS exacerbation with significant improvement with first dose of steroids.  previous imaging without correlates for current clinical symptoms of left leg weakness.    continue solumedrol 1g IV daily, plan for 3-5 days  MRI brain, C, T w and w/o  cont protonix inpatient  plan for outpatient ocrevus
MS exacerbation doing very well with steroids. exam normal other than hyper-reflexia    continue solumedrol 1g IV for 5 days  MRI Brain, C and T w and w/o
new MS doing very well with steroids. clinical exam normal. MRI shows the left leg symptoms were due to new enhancing lesion in the left cerebellar peduncle.  spinal cord imaging re-assuring    plan for 5 total days solumedrol 1g IV daily then discharge home tomorrow

## 2024-09-12 NOTE — PROGRESS NOTE ADULT - SUBJECTIVE AND OBJECTIVE BOX
Neurology Progress Note    INTERVAL HPI/OVERNIGHT EVENTS:  Patient seen and examined.     MEDICATIONS  (STANDING):  enoxaparin Injectable 40 milliGRAM(s) SubCutaneous every 24 hours  influenza   Vaccine 0.5 milliLiter(s) IntraMuscular once  methylPREDNISolone sodium succinate IVPB 1000 milliGRAM(s) IV Intermittent every 24 hours  pantoprazole    Tablet 40 milliGRAM(s) Oral before breakfast    MEDICATIONS  (PRN):  acetaminophen     Tablet .. 650 milliGRAM(s) Oral every 6 hours PRN Temp greater or equal to 38C (100.4F), Mild Pain (1 - 3), Moderate Pain (4 - 6)      Allergies    No Known Allergies    Intolerances        ROS: As per HPI, otherwise negative    Vital Signs Last 24 Hrs  T(C): 36.7 (12 Sep 2024 05:43), Max: 36.7 (11 Sep 2024 11:37)  T(F): 98 (12 Sep 2024 05:43), Max: 98.1 (11 Sep 2024 11:37)  HR: 60 (12 Sep 2024 05:43) (60 - 73)  BP: 120/60 (12 Sep 2024 05:43) (120/60 - 130/74)  BP(mean): --  RR: 18 (12 Sep 2024 05:43) (17 - 18)  SpO2: 97% (12 Sep 2024 05:43) (97% - 98%)    Parameters below as of 12 Sep 2024 05:43  Patient On (Oxygen Delivery Method): room air        Physical exam:  General: awake and alert, sitting comfortably, no acute distress    Neurologic:  General:  Appearance is consistent with chronologic age.   Cognitive/Language:  Awake, alert, and oriented to person, place, time and date.  Recent and remote memory intact.  Fund of knowledge is appropriate.  Naming, repetition and comprehension intact. Nondysarthric.    Cranial Nerves  - Eyes: Visual acuity intact, Visual fields full.  EOMI w/o nystagmus, skew or reported double vision.  PERRL.  No ptosis/weakness of eyelid closure.    - Face: Facial sensation normal V1 - 3, no facial asymmetry.    - Ears/Nose/Throat:  Hearing grossly intact b/l to finger rub.  Palate elevates midline.  Tongue and uvula midline.   Motor exam: Normal tone and bulk. No tenderness, twitching, tremors or involuntary movements.   - MRC grading:          Upper extremity                  Bicep     Tricep     HG                                                 R      5/5        5/5          5/5                                                    L       5/5 5/5 5/5              Lower extremity                   HF        KE        DF         PF                                                  R     5/5 5/5 5/5 5/5                                               L      5/5 5/5 5/5 5/5  Sensory examination:  Intact to light touch and pinprick, pain, temperature and proprioception and vibration in all extremities.  Reflexes: 2+ b/l biceps, triceps and achilles. 2+left patella with spread to right adductor, 2+ R patella,  Plantar response downgoing in right foot, mute in left.   Cerebellum: FTN/HKS intact.  No dysmetria.    Gait narrow based and normal.          LABS:                        14.1   13.63 )-----------( 328      ( 11 Sep 2024 05:30 )             42.9     09-11    138  |  107  |  8   ----------------------------<  162<H>  4.3   |  19<L>  |  0.69    Ca    9.4      11 Sep 2024 05:30  Phos  3.1     09-11  Mg     2.1     09-11    TPro  7.2  /  Alb  4.0  /  TBili  0.2  /  DBili  x   /  AST  11  /  ALT  15  /  AlkPhos  66  09-11      Urinalysis Basic - ( 11 Sep 2024 05:30 )    Color: x / Appearance: x / SG: x / pH: x  Gluc: 162 mg/dL / Ketone: x  / Bili: x / Urobili: x   Blood: x / Protein: x / Nitrite: x   Leuk Esterase: x / RBC: x / WBC x   Sq Epi: x / Non Sq Epi: x / Bacteria: x        RADIOLOGY & ADDITIONAL TESTS: ---------Neurology Progress Note--------    Interval History:    No overnight acute events. States she is feeling fine, no complaints currently. Denies fever, chills, abd pain, n/v/d.       Vital Signs Last 24 Hrs  T(C): 36.7 (12 Sep 2024 12:08), Max: 36.7 (11 Sep 2024 20:17)  T(F): 98.1 (12 Sep 2024 12:08), Max: 98.1 (11 Sep 2024 20:17)  HR: 69 (12 Sep 2024 12:08) (60 - 73)  BP: 126/76 (12 Sep 2024 12:08) (120/60 - 126/76)  BP(mean): --  RR: 17 (12 Sep 2024 12:08) (17 - 18)  SpO2: 97% (12 Sep 2024 12:08) (97% - 98%)    Parameters below as of 12 Sep 2024 12:08  Patient On (Oxygen Delivery Method): room air        Physical exam:  General: awake and alert, sitting comfortably, no acute distress    Neurologic:  Mental status: awake, alert, oriented to person, place, and time. Speech is fluent, able to name objects. Follows commands. Attention/concentration intact. No dysarthria, no aphasia.  Cranial nerves:   II: visual fields are full to confrontation. pupils equally round and reactive to light,   III, IV, VI: EOMI without nystagmus  V:  V1-V3 sensation intact,   VII: no facial droop, facie is symmetric with normal eye closure and smile  VIII: hearing is intact to finger rub  IX, X: Uvula is midline and soft palate rises symmetrically  XI: head turning and shoulder shrug are intact.  XII: tongue midline  Motor: Normal bulk and tone, strength 5/5 in b/l UE and LE,  strength 5/5. No pronator drift  Sensation: intact to light touch. No neglect.  Coordination: No dysmetria on finger-to-nose and heel-to-shin  Reflexes: 2+ brachioradialis, tricep and bicep BL. + Hyperreflexive 3/5 L patellar. + Posada in left hand. Downgoing toes bilaterally,   Gait: Deferred.       Labs:  CBC Full  -  ( 12 Sep 2024 09:35 )  WBC Count : 14.76 K/uL  RBC Count : 5.34 M/uL  Hemoglobin : 14.7 g/dL  Hematocrit : 45.8 %  Platelet Count - Automated : 329 K/uL  Mean Cell Volume : 85.8 fl  Mean Cell Hemoglobin : 27.5 pg  Mean Cell Hemoglobin Concentration : 32.1 gm/dL  Auto Neutrophil # : 13.09 K/uL  Auto Lymphocyte # : 1.10 K/uL  Auto Monocyte # : 0.48 K/uL  Auto Eosinophil # : 0.01 K/uL  Auto Basophil # : 0.01 K/uL  Auto Neutrophil % : 88.5 %  Auto Lymphocyte % : 7.5 %  Auto Monocyte % : 3.3 %  Auto Eosinophil % : 0.1 %  Auto Basophil % : 0.1 %    09-12    138  |  103  |  8   ----------------------------<  139<H>  3.9   |  24  |  0.70    Ca    9.4      12 Sep 2024 09:35  Phos  3.7     09-12  Mg     2.2     09-12    TPro  7.6  /  Alb  4.2  /  TBili  0.2  /  DBili  x   /  AST  13  /  ALT  15  /  AlkPhos  69  09-12    LIVER FUNCTIONS - ( 12 Sep 2024 09:35 )  Alb: 4.2 g/dL / Pro: 7.6 g/dL / ALK PHOS: 69 U/L / ALT: 15 U/L / AST: 13 U/L / GGT: x             Urinalysis Basic - ( 12 Sep 2024 09:35 )    Color: x / Appearance: x / SG: x / pH: x  Gluc: 139 mg/dL / Ketone: x  / Bili: x / Urobili: x   Blood: x / Protein: x / Nitrite: x   Leuk Esterase: x / RBC: x / WBC x   Sq Epi: x / Non Sq Epi: x / Bacteria: x        Radiology and Other Testings:    Medications:  acetaminophen     Tablet .. 650 milliGRAM(s) Oral every 6 hours PRN  enoxaparin Injectable 40 milliGRAM(s) SubCutaneous every 24 hours  influenza   Vaccine 0.5 milliLiter(s) IntraMuscular once  methylPREDNISolone sodium succinate IVPB 1000 milliGRAM(s) IV Intermittent every 24 hours  pantoprazole    Tablet 40 milliGRAM(s) Oral before breakfast    MRI head w/wo (9/11/24):  There are again numerous scattered lesions in the periventricular, subcortical, and juxtacortical white matter bilaterally, consistent with a demyelinating process. Since 7/1/2024, there are a few new lesions as   described above. None of these lesions enhance to suggest an active lesion. No infratentorial lesions are identified.      MRI C and T spine w/wo (9/11/24):   No areas of signal abnormality or abnormal enhancement within the cervical thoracic cord to suggest sequela of chronic or active demyelination.  Mild multilevel cervical discogenic degenerative disease without significant spinal canal or neuroforaminal narrowing.

## 2024-09-12 NOTE — CONSULT NOTE ADULT - NS ATTEST RISK PROBLEM GEN_ALL_CORE FT
This patient requires high level of decision making due to:  1. At least 1 chronic illness with severe exacerbation  2. Discussion with primary team, independent review of MRI

## 2024-09-13 ENCOUNTER — TRANSCRIPTION ENCOUNTER (OUTPATIENT)
Age: 24
End: 2024-09-13

## 2024-09-13 VITALS
RESPIRATION RATE: 18 BRPM | HEART RATE: 58 BPM | TEMPERATURE: 98 F | SYSTOLIC BLOOD PRESSURE: 118 MMHG | DIASTOLIC BLOOD PRESSURE: 77 MMHG | OXYGEN SATURATION: 98 %

## 2024-09-13 LAB
ALBUMIN SERPL ELPH-MCNC: 3.8 G/DL — SIGNIFICANT CHANGE UP (ref 3.3–5)
ALP SERPL-CCNC: 64 U/L — SIGNIFICANT CHANGE UP (ref 40–120)
ALT FLD-CCNC: 13 U/L — SIGNIFICANT CHANGE UP (ref 10–45)
ANION GAP SERPL CALC-SCNC: 9 MMOL/L — SIGNIFICANT CHANGE UP (ref 5–17)
AST SERPL-CCNC: 9 U/L — LOW (ref 10–40)
BASOPHILS # BLD AUTO: 0.02 K/UL — SIGNIFICANT CHANGE UP (ref 0–0.2)
BASOPHILS NFR BLD AUTO: 0.1 % — SIGNIFICANT CHANGE UP (ref 0–2)
BILIRUB SERPL-MCNC: 0.3 MG/DL — SIGNIFICANT CHANGE UP (ref 0.2–1.2)
BUN SERPL-MCNC: 10 MG/DL — SIGNIFICANT CHANGE UP (ref 7–23)
CALCIUM SERPL-MCNC: 9.3 MG/DL — SIGNIFICANT CHANGE UP (ref 8.4–10.5)
CHLORIDE SERPL-SCNC: 106 MMOL/L — SIGNIFICANT CHANGE UP (ref 96–108)
CO2 SERPL-SCNC: 24 MMOL/L — SIGNIFICANT CHANGE UP (ref 22–31)
CREAT SERPL-MCNC: 0.67 MG/DL — SIGNIFICANT CHANGE UP (ref 0.5–1.3)
EGFR: 124 ML/MIN/1.73M2 — SIGNIFICANT CHANGE UP
EOSINOPHIL # BLD AUTO: 0.01 K/UL — SIGNIFICANT CHANGE UP (ref 0–0.5)
EOSINOPHIL NFR BLD AUTO: 0.1 % — SIGNIFICANT CHANGE UP (ref 0–6)
GLUCOSE SERPL-MCNC: 106 MG/DL — HIGH (ref 70–99)
HCT VFR BLD CALC: 45.1 % — HIGH (ref 34.5–45)
HGB BLD-MCNC: 14.7 G/DL — SIGNIFICANT CHANGE UP (ref 11.5–15.5)
IMM GRANULOCYTES NFR BLD AUTO: 0.6 % — SIGNIFICANT CHANGE UP (ref 0–0.9)
LYMPHOCYTES # BLD AUTO: 1.72 K/UL — SIGNIFICANT CHANGE UP (ref 1–3.3)
LYMPHOCYTES # BLD AUTO: 10.4 % — LOW (ref 13–44)
MCHC RBC-ENTMCNC: 27.5 PG — SIGNIFICANT CHANGE UP (ref 27–34)
MCHC RBC-ENTMCNC: 32.6 GM/DL — SIGNIFICANT CHANGE UP (ref 32–36)
MCV RBC AUTO: 84.5 FL — SIGNIFICANT CHANGE UP (ref 80–100)
MONOCYTES # BLD AUTO: 1.09 K/UL — HIGH (ref 0–0.9)
MONOCYTES NFR BLD AUTO: 6.6 % — SIGNIFICANT CHANGE UP (ref 2–14)
NEUTROPHILS # BLD AUTO: 13.6 K/UL — HIGH (ref 1.8–7.4)
NEUTROPHILS NFR BLD AUTO: 82.2 % — HIGH (ref 43–77)
NRBC # BLD: 0 /100 WBCS — SIGNIFICANT CHANGE UP (ref 0–0)
PLATELET # BLD AUTO: 312 K/UL — SIGNIFICANT CHANGE UP (ref 150–400)
POTASSIUM SERPL-MCNC: 4.1 MMOL/L — SIGNIFICANT CHANGE UP (ref 3.5–5.3)
POTASSIUM SERPL-SCNC: 4.1 MMOL/L — SIGNIFICANT CHANGE UP (ref 3.5–5.3)
PROT SERPL-MCNC: 7 G/DL — SIGNIFICANT CHANGE UP (ref 6–8.3)
RBC # BLD: 5.34 M/UL — HIGH (ref 3.8–5.2)
RBC # FLD: 12.3 % — SIGNIFICANT CHANGE UP (ref 10.3–14.5)
SODIUM SERPL-SCNC: 139 MMOL/L — SIGNIFICANT CHANGE UP (ref 135–145)
WBC # BLD: 16.54 K/UL — HIGH (ref 3.8–10.5)
WBC # FLD AUTO: 16.54 K/UL — HIGH (ref 3.8–10.5)

## 2024-09-13 PROCEDURE — 99285 EMERGENCY DEPT VISIT HI MDM: CPT

## 2024-09-13 PROCEDURE — 85025 COMPLETE CBC W/AUTO DIFF WBC: CPT

## 2024-09-13 PROCEDURE — 36415 COLL VENOUS BLD VENIPUNCTURE: CPT

## 2024-09-13 PROCEDURE — 80048 BASIC METABOLIC PNL TOTAL CA: CPT

## 2024-09-13 PROCEDURE — 80053 COMPREHEN METABOLIC PANEL: CPT

## 2024-09-13 PROCEDURE — 84443 ASSAY THYROID STIM HORMONE: CPT

## 2024-09-13 PROCEDURE — 97165 OT EVAL LOW COMPLEX 30 MIN: CPT

## 2024-09-13 PROCEDURE — A9585: CPT

## 2024-09-13 PROCEDURE — 97161 PT EVAL LOW COMPLEX 20 MIN: CPT

## 2024-09-13 PROCEDURE — 83735 ASSAY OF MAGNESIUM: CPT

## 2024-09-13 PROCEDURE — 70553 MRI BRAIN STEM W/O & W/DYE: CPT | Mod: MC

## 2024-09-13 PROCEDURE — 82607 VITAMIN B-12: CPT

## 2024-09-13 PROCEDURE — 99232 SBSQ HOSP IP/OBS MODERATE 35: CPT

## 2024-09-13 PROCEDURE — 84100 ASSAY OF PHOSPHORUS: CPT

## 2024-09-13 PROCEDURE — 82746 ASSAY OF FOLIC ACID SERUM: CPT

## 2024-09-13 PROCEDURE — 72157 MRI CHEST SPINE W/O & W/DYE: CPT | Mod: MC

## 2024-09-13 PROCEDURE — 72156 MRI NECK SPINE W/O & W/DYE: CPT | Mod: MC

## 2024-09-13 PROCEDURE — 99239 HOSP IP/OBS DSCHRG MGMT >30: CPT

## 2024-09-13 RX ORDER — METHYLPREDNISOLONE 4 MG
1000 TABLET ORAL ONCE
Refills: 0 | Status: COMPLETED | OUTPATIENT
Start: 2024-09-13 | End: 2024-09-13

## 2024-09-13 RX ADMIN — Medication 50 MILLIGRAM(S): at 09:53

## 2024-09-13 RX ADMIN — Medication 40 MILLIGRAM(S): at 06:46

## 2024-09-13 NOTE — DISCHARGE NOTE PROVIDER - HOSPITAL COURSE
The patient is a 24-year-old female with a PMHx of MS, who presents for 1 week of new left leg dragging/incoordination concerning for a relapse.  MRI brain shows new lesions in left cerebellar peduncle otherwise no lesions on the spine. S/P 5 days of IV methylprednisolone and to start Ocrevus infusion outpatient.     Hospital course:     #MS  #LE incoordination, symptoms now improving.   - S/P methylprednisolone 1g IV x 5 days (9/9-9/13)  - MRI brain and spine as below  - Plan to start Ocrevus as an outpatient post-admission on 9/14.     Imaging:  MRI head w/wo (9/11/24):  There are again numerous scattered lesions in the periventricular, subcortical, and juxtacortical white matter bilaterally, consistent with a demyelinating process. Since 7/1/2024, there are a few new lesions as described above. None of these lesions enhance to suggest an active lesion. No infratentorial lesions are identified.    MRI C and T spine w/wo (9/11/24):   No areas of signal abnormality or abnormal enhancement within the cervical thoracic cord to suggest sequela of chronic or active demyelination.  Mild multilevel cervical discogenic degenerative disease without significant spinal canal or neuroforaminal narrowing.    Patient was discharged to: Home  Items to follow up as outpatient: Follow up outpatient Neuro and NeuroOptho    Physical exam at the time of discharge:   General: awake and alert, sitting comfortably, no acute distress    Neurologic:  Mental status: awake, alert, oriented to person, place, and time. Speech is fluent, able to name objects. Follows commands. Attention/concentration intact. No dysarthria, no aphasia.  Cranial nerves:   II: visual fields are full to confrontation. pupils equally round and reactive to light,   III, IV, VI: EOMI without nystagmus  V:  V1-V3 sensation intact,   VII: no facial droop, facie is symmetric with normal eye closure and smile  VIII: hearing is intact to finger rub  IX, X: Uvula is midline and soft palate rises symmetrically  XI: head turning and shoulder shrug are intact.  XII: tongue midline  Motor: Normal bulk and tone, strength 5/5 in b/l UE and LE,  strength 5/5. No pronator drift  Sensation: intact to light touch. No neglect.  Coordination: No dysmetria on finger-to-nose and heel-to-shin  Reflexes: 2+ brachioradialis, tricep and bicep BL. + Hyperreflexive 3/5 L patellar. + Posada in left hand. Downgoing toes bilaterally,   Gait: Deferred.    The patient is a 24-year-old female with a PMHx of MS, who presents for 1 week of new left leg dragging/incoordination concerning for a relapse.  MRI brain shows new lesions in left cerebellar peduncle otherwise no lesions on the spine. S/P 5 days of IV methylprednisolone and to start Ocrevus infusion outpatient.     Hospital course:     #MS  #LE incoordination, symptoms now improving.   - S/P methylprednisolone 1g IV x 5 days (9/9-9/13)  - MRI brain and spine as below  - Plan to start Ocrevus as an outpatient post-admission on 9/14.   - Follow up outpatient with      Imaging:  MRI head w/wo (9/11/24):  There are again numerous scattered lesions in the periventricular, subcortical, and juxtacortical white matter bilaterally, consistent with a demyelinating process. Since 7/1/2024, there are a few new lesions as described above. None of these lesions enhance to suggest an active lesion. No infratentorial lesions are identified.    MRI C and T spine w/wo (9/11/24):   No areas of signal abnormality or abnormal enhancement within the cervical thoracic cord to suggest sequela of chronic or active demyelination.  Mild multilevel cervical discogenic degenerative disease without significant spinal canal or neuroforaminal narrowing.    Patient was discharged to: Home  Items to follow up as outpatient: Follow up outpatient Neuro and NeuroOptho    Physical exam at the time of discharge:   General: awake and alert, sitting comfortably, no acute distress    Neurologic:  Mental status: awake, alert, oriented to person, place, and time. Speech is fluent, able to name objects. Follows commands. Attention/concentration intact. No dysarthria, no aphasia.  Cranial nerves:   II: visual fields are full to confrontation. pupils equally round and reactive to light,   III, IV, VI: EOMI without nystagmus  V:  V1-V3 sensation intact,   VII: no facial droop, facie is symmetric with normal eye closure and smile  VIII: hearing is intact to finger rub  IX, X: Uvula is midline and soft palate rises symmetrically  XI: head turning and shoulder shrug are intact.  XII: tongue midline  Motor: Normal bulk and tone, strength 5/5 in b/l UE and LE,  strength 5/5. No pronator drift  Sensation: intact to light touch. No neglect.  Coordination: No dysmetria on finger-to-nose and heel-to-shin  Reflexes: 2+ brachioradialis, tricep and bicep BL. + Hyperreflexive 3/5 L patellar. + Posada in left hand. Downgoing toes bilaterally,   Gait: Deferred.

## 2024-09-13 NOTE — DISCHARGE NOTE PROVIDER - COLLABORATE WITH
----- Message from Amy Davis sent at 6/4/2024 11:23 AM CDT -----  Type:  Test Results    Who Called: self     Name of Test (Lab/Mammo/Etc): MRI    Date of Test: last week    Ordering Provider: Dr Song    Where the test was performed: [facility]    Would the patient rather a call back or a response via [facility] [employer]? Call back     Best Call Back Number:[phone]    For Clinical Team:Has the provider reviewed the results?   Resident

## 2024-09-13 NOTE — DISCHARGE NOTE PROVIDER - CARE PROVIDER_API CALL
Markus Mahan  Neurology  [address] [address]  Phone: [phone]  Fax: [phone]  Follow Up Time: 2 weeks

## 2024-09-13 NOTE — DISCHARGE NOTE NURSING/CASE MANAGEMENT/SOCIAL WORK - NSDCPEFALRISK_GEN_ALL_CORE
For information on Fall & Injury Prevention, visit: [email or web address] OR  [email or web address] OR  https://www.cdc.gov/steadi/patient.html

## 2024-09-13 NOTE — CONSULT NOTE ADULT - SUBJECTIVE AND OBJECTIVE BOX
Admission H&P:  HPI:  Ms. Sands is a 24-year-old female with a PMHx of MS, who presents for 1 week of new left leg dragging/incoordination concerning for a relapse. The patient was diagnosed with MS in August 2024, following an encounter for right optic neuritis in July (vision returned fully back to baseline). Brain MRI at the time was typical for MS with periventricular and juxtacortical T2 lesions. CSF specific oligoclonal bands were present. The patient states that she feels as if the leg is weak, and that if she puts weight on it, it might "give out", but she denies any numbness or tingling. She also denies falls, as well as urinary, respiratory, and infectious symptoms. She also denies vision loss, vertigo, and loss of consciousness.     Interval:  - NAEO, VSS  - WBC 13.6 iso steroids, no infectious sx    PAST MEDICAL & SURGICAL HISTORY:  Multiple sclerosis        Home Medications:    Allergies    No Known Allergies    Intolerances      FAMILY HISTORY:    Social History:  The patient lives in an apartment with roommates. She denies tobacco use and recreational drug use, but endorses social alcohol use (none since her current symptoms started) (09 Sep 2024 20:40)      REVIEW OF SYSTEMS:  RESPIRATORY: No cough, No dyspnea  CARDIOVASCULAR: No chest pain, or leg swelling  GASTROINTESTINAL: no diarrhea  GENITOURINARY: No dysuria,   NEUROLOGICAL: No numbness, or tremors  ALLERGY AND IMMUNOLOGIC: No hives or eczema    Diet, Regular:   Lacto-Ovo Veg (Accepts Milk Prod., Eggs) (09-09-24 @ 21:01) [Active]      CURRENT MEDICATIONS:   acetaminophen     Tablet .. 650 milliGRAM(s) Oral every 6 hours PRN  enoxaparin Injectable 40 milliGRAM(s) SubCutaneous every 24 hours  influenza   Vaccine 0.5 milliLiter(s) IntraMuscular once  LORazepam     Tablet 0.5 milliGRAM(s) Oral once PRN  methylPREDNISolone sodium succinate IVPB 1000 milliGRAM(s) IV Intermittent every 24 hours  pantoprazole    Tablet 40 milliGRAM(s) Oral before breakfast      VITAL SIGNS, INS/OUTS (last 24 hours):  Vital Signs Last 24 Hrs  T(C): 36.7 (11 Sep 2024 05:45), Max: 36.7 (11 Sep 2024 05:45)  T(F): 98.1 (11 Sep 2024 05:45), Max: 98.1 (11 Sep 2024 05:45)  HR: 74 (11 Sep 2024 05:45) (66 - 101)  BP: 102/51 (11 Sep 2024 05:45) (102/51 - 123/87)  BP(mean): 68 (11 Sep 2024 05:45) (68 - 68)  RR: 16 (11 Sep 2024 05:45) (16 - 18)  SpO2: 97% (11 Sep 2024 05:45) (96% - 98%)    Parameters below as of 11 Sep 2024 05:45  Patient On (Oxygen Delivery Method): room air      I&O's Summary    10 Sep 2024 07:01  -  11 Sep 2024 07:00  --------------------------------------------------------  IN: 50 mL / OUT: 0 mL / NET: 50 mL        PHYSICAL EXAM:  Gen: Reclining in bed at time of exam, appears stated age  HEENT: NCAT, MMM, clear OP  Neck: supple, trachea at midline  CV: RRR, +S1/S2  Pulm: adequate respiratory effort, no increase in work of breathing  Abd: soft, ND  Skin: warm and dry,  Ext:   Neuro: AOx3, speaking in full sentences   Psych: affect and behavior appropriate, pleasant at time of interview    BASIC LABS:                        14.1   13.63 )-----------( 328      ( 11 Sep 2024 05:30 )             42.9     09-11    138  |  107  |  8   ----------------------------<  162<H>  4.3   |  19<L>  |  0.69    Ca    9.4      11 Sep 2024 05:30  Phos  3.1     09-11  Mg     2.1     09-11    TPro  7.2  /  Alb  4.0  /  TBili  0.2  /  DBili  x   /  AST  11  /  ALT  15  /  AlkPhos  66  09-11      Urinalysis Basic - ( 11 Sep 2024 05:30 )    Color: x / Appearance: x / SG: x / pH: x  Gluc: 162 mg/dL / Ketone: x  / Bili: x / Urobili: x   Blood: x / Protein: x / Nitrite: x   Leuk Esterase: x / RBC: x / WBC x   Sq Epi: x / Non Sq Epi: x / Bacteria: x      CAPILLARY BLOOD GLUCOSE          OTHER LABS:        MICRODATA:      IMAGING:    EKG:
Admission H&P:  HPI:  Ms. Sands is a 24-year-old female with a PMHx of MS, who presents for 1 week of new left leg dragging/incoordination concerning for a relapse. The patient was diagnosed with MS in August 2024, following an encounter for right optic neuritis in July (vision returned fully back to baseline). Brain MRI at the time was typical for MS with periventricular and juxtacortical T2 lesions. CSF specific oligoclonal bands were present. The patient states that she feels as if the leg is weak, and that if she puts weight on it, it might "give out", but she denies any numbness or tingling. She also denies falls, as well as urinary, respiratory, and infectious symptoms. She also denies vision loss, vertigo, and loss of consciousness.     Interval events:  - Last day of methylpred today then plan for dc  - Patient has no acute complaints, feels well and eager to return home      PAST MEDICAL & SURGICAL HISTORY:  Multiple sclerosis        Home Medications:    Allergies    No Known Allergies    Intolerances      FAMILY HISTORY:    Social History:  The patient lives in an apartment with roommates. She denies tobacco use and recreational drug use, but endorses social alcohol use (none since her current symptoms started) (09 Sep 2024 20:40)      REVIEW OF SYSTEMS:  RESPIRATORY: No cough, No dyspnea  CARDIOVASCULAR: No chest pain, or leg swelling  GASTROINTESTINAL: no diarrhea  GENITOURINARY: No dysuria,   NEUROLOGICAL: No numbness, or tremors  ALLERGY AND IMMUNOLOGIC: No hives or eczema    Diet, Regular:   Lacto-Ovo Veg (Accepts Milk Prod., Eggs) (09-09-24 @ 21:01) [Active]      CURRENT MEDICATIONS:   acetaminophen     Tablet .. 650 milliGRAM(s) Oral every 6 hours PRN  enoxaparin Injectable 40 milliGRAM(s) SubCutaneous every 24 hours  influenza   Vaccine 0.5 milliLiter(s) IntraMuscular once  pantoprazole    Tablet 40 milliGRAM(s) Oral before breakfast      VITAL SIGNS, INS/OUTS (last 24 hours):  Vital Signs Last 24 Hrs  T(C): 36.6 (13 Sep 2024 07:00), Max: 36.8 (12 Sep 2024 20:54)  T(F): 97.8 (13 Sep 2024 07:00), Max: 98.3 (12 Sep 2024 20:54)  HR: 58 (13 Sep 2024 07:00) (58 - 98)  BP: 118/77 (13 Sep 2024 07:00) (118/77 - 120/70)  BP(mean): --  RR: 18 (13 Sep 2024 07:00) (18 - 18)  SpO2: 98% (13 Sep 2024 07:00) (97% - 98%)    Parameters below as of 13 Sep 2024 07:00  Patient On (Oxygen Delivery Method): room air      I&O's Summary      PHYSICAL EXAM:  Gen: Reclining in bed at time of exam, appears stated age  HEENT: NCAT, MMM, clear OP  Neck: supple, trachea at midline  CV: RRR, +S1/S2  Pulm: adequate respiratory effort, no increase in work of breathing  Abd: soft, ND  Skin: warm and dry,  Ext: no edema  Neuro: AOx3, speaking in full sentences   Psych: affect and behavior appropriate, pleasant at time of interview    BASIC LABS:                        14.7   16.54 )-----------( 312      ( 13 Sep 2024 07:30 )             45.1     09-13    139  |  106  |  10  ----------------------------<  106<H>  4.1   |  24  |  0.67    Ca    9.3      13 Sep 2024 07:30  Phos  3.7     09-12  Mg     2.2     09-12    TPro  7.0  /  Alb  3.8  /  TBili  0.3  /  DBili  x   /  AST  9<L>  /  ALT  13  /  AlkPhos  64  09-13      Urinalysis Basic - ( 13 Sep 2024 07:30 )    Color: x / Appearance: x / SG: x / pH: x  Gluc: 106 mg/dL / Ketone: x  / Bili: x / Urobili: x   Blood: x / Protein: x / Nitrite: x   Leuk Esterase: x / RBC: x / WBC x   Sq Epi: x / Non Sq Epi: x / Bacteria: x      CAPILLARY BLOOD GLUCOSE          OTHER LABS:        MICRODATA:      IMAGING:    EKG:
Admission H&P:  HPI:  Ms. Sands is a 24-year-old female with a PMHx of MS, who presents for 1 week of new left leg dragging/incoordination concerning for a relapse. The patient was diagnosed with MS in August 2024, following an encounter for right optic neuritis in July (vision returned fully back to baseline). Brain MRI at the time was typical for MS with periventricular and juxtacortical T2 lesions. CSF specific oligoclonal bands were present. The patient states that she feels as if the leg is weak, and that if she puts weight on it, it might "give out", but she denies any numbness or tingling. She also denies falls, as well as urinary, respiratory, and infectious symptoms. She also denies vision loss, vertigo, and loss of consciousness.     Interval events:  - Continues on methylpred, day 4 today  - Vitals stable, labs reviewed  - MRI done with new lesions, none enhancing. Imaging reviewed.  - Patient feels symptoms improving, eager to go home tomorrow      PAST MEDICAL & SURGICAL HISTORY:  Multiple sclerosis        Home Medications:    Allergies    No Known Allergies    Intolerances      FAMILY HISTORY:    Social History:  The patient lives in an apartment with roommates. She denies tobacco use and recreational drug use, but endorses social alcohol use (none since her current symptoms started) (09 Sep 2024 20:40)      REVIEW OF SYSTEMS:  RESPIRATORY: No cough, No dyspnea  CARDIOVASCULAR: No chest pain, or leg swelling  GASTROINTESTINAL: no diarrhea  GENITOURINARY: No dysuria,   NEUROLOGICAL: No numbness, or tremors  ALLERGY AND IMMUNOLOGIC: No hives or eczema    Diet, Regular:   Lacto-Ovo Veg (Accepts Milk Prod., Eggs) (09-09-24 @ 21:01) [Active]      CURRENT MEDICATIONS:   acetaminophen     Tablet .. 650 milliGRAM(s) Oral every 6 hours PRN  enoxaparin Injectable 40 milliGRAM(s) SubCutaneous every 24 hours  influenza   Vaccine 0.5 milliLiter(s) IntraMuscular once  methylPREDNISolone sodium succinate IVPB 1000 milliGRAM(s) IV Intermittent every 24 hours  pantoprazole    Tablet 40 milliGRAM(s) Oral before breakfast      VITAL SIGNS, INS/OUTS (last 24 hours):  Vital Signs Last 24 Hrs  T(C): 36.7 (12 Sep 2024 12:08), Max: 36.7 (11 Sep 2024 20:17)  T(F): 98.1 (12 Sep 2024 12:08), Max: 98.1 (11 Sep 2024 20:17)  HR: 69 (12 Sep 2024 12:08) (60 - 73)  BP: 126/76 (12 Sep 2024 12:08) (120/60 - 126/76)  BP(mean): --  RR: 17 (12 Sep 2024 12:08) (17 - 18)  SpO2: 97% (12 Sep 2024 12:08) (97% - 98%)    Parameters below as of 12 Sep 2024 12:08  Patient On (Oxygen Delivery Method): room air      I&O's Summary      PHYSICAL EXAM:  Gen: Reclining in bed at time of exam, appears stated age  HEENT: NCAT, MMM, clear OP  Neck: supple, trachea at midline  CV: RRR, +S1/S2  Pulm: adequate respiratory effort, no increase in work of breathing  Abd: soft, ND  Skin: warm and dry,  Ext: No edema  Neuro: AOx3, speaking in full sentences   Psych: affect and behavior appropriate, pleasant at time of interview    BASIC LABS:                        14.7   14.76 )-----------( 329      ( 12 Sep 2024 09:35 )             45.8     09-12    138  |  103  |  8   ----------------------------<  139<H>  3.9   |  24  |  0.70    Ca    9.4      12 Sep 2024 09:35  Phos  3.7     09-12  Mg     2.2     09-12    TPro  7.6  /  Alb  4.2  /  TBili  0.2  /  DBili  x   /  AST  13  /  ALT  15  /  AlkPhos  69  09-12      Urinalysis Basic - ( 12 Sep 2024 09:35 )    Color: x / Appearance: x / SG: x / pH: x  Gluc: 139 mg/dL / Ketone: x  / Bili: x / Urobili: x   Blood: x / Protein: x / Nitrite: x   Leuk Esterase: x / RBC: x / WBC x   Sq Epi: x / Non Sq Epi: x / Bacteria: x      CAPILLARY BLOOD GLUCOSE          OTHER LABS:        MICRODATA:      IMAGING:    EKG:
Admission H&P:  HPI:  "Ms. Sands is a 24-year-old female with a PMHx of MS, who presents for 1 week of new left leg dragging/incoordination concerning for a relapse. The patient was diagnosed with MS in August 2024, following an encounter for right optic neuritis in July (vision returned fully back to baseline). Brain MRI at the time was typical for MS with periventricular and juxtacortical T2 lesions. CSF specific oligoclonal bands were present. The patient states that she feels as if the leg is weak, and that if she puts weight on it, it might "give out", but she denies any numbness or tingling. She also denies falls, as well as urinary, respiratory, and infectious symptoms. She also denies vision loss, vertigo, and loss of consciousness."    Interval events:  - Started on methylpred 1g overnight. Patient reports still subtle unsteadiness/weakness of left leg      PAST MEDICAL & SURGICAL HISTORY:  Multiple sclerosis        Home Medications:    Allergies    No Known Allergies    Intolerances      FAMILY HISTORY:    Social History:  The patient lives in an apartment with roommates. She denies tobacco use and recreational drug use, but endorses social alcohol use (none since her current symptoms started) (09 Sep 2024 20:40)      REVIEW OF SYSTEMS:  RESPIRATORY: No cough, No dyspnea  CARDIOVASCULAR: No chest pain, or leg swelling  GASTROINTESTINAL: no diarrhea  GENITOURINARY: No dysuria,   NEUROLOGICAL: No numbness, or tremors  ALLERGY AND IMMUNOLOGIC: No hives or eczema    Diet, Regular:   Lacto-Ovo Veg (Accepts Milk Prod., Eggs) (09-09-24 @ 21:01) [Active]      CURRENT MEDICATIONS:   acetaminophen     Tablet .. 650 milliGRAM(s) Oral every 6 hours PRN  enoxaparin Injectable 40 milliGRAM(s) SubCutaneous once  influenza   Vaccine 0.5 milliLiter(s) IntraMuscular once  LORazepam     Tablet 0.5 milliGRAM(s) Oral once PRN  methylPREDNISolone sodium succinate IVPB 1000 milliGRAM(s) IV Intermittent every 24 hours  pantoprazole    Tablet 40 milliGRAM(s) Oral before breakfast      VITAL SIGNS, INS/OUTS (last 24 hours):  Vital Signs Last 24 Hrs  T(C): 36.6 (10 Sep 2024 05:27), Max: 36.9 (09 Sep 2024 19:10)  T(F): 97.9 (10 Sep 2024 05:27), Max: 98.5 (09 Sep 2024 19:10)  HR: 85 (10 Sep 2024 05:27) (63 - 85)  BP: 127/52 (10 Sep 2024 05:27) (125/85 - 131/83)  BP(mean): 777 (10 Sep 2024 05:27) (777 - 777)  RR: 17 (10 Sep 2024 05:27) (16 - 17)  SpO2: 97% (10 Sep 2024 05:27) (95% - 99%)    Parameters below as of 10 Sep 2024 05:27  Patient On (Oxygen Delivery Method): room air      I&O's Summary      PHYSICAL EXAM:  Gen: Reclining in bed at time of exam, appears stated age  HEENT: NCAT, MMM, clear OP  Neck: supple, trachea at midline  CV: RRR, +S1/S2  Pulm: adequate respiratory effort, no increase in work of breathing  Abd: soft, ND  Skin: warm and dry,  Ext: no edema  Neuro: AOx3, speaking in full sentences   Psych: affect and behavior appropriate, pleasant at time of interview    BASIC LABS:                        16.2   4.24  )-----------( 332      ( 10 Sep 2024 05:30 )             49.8     09-10    137  |  102  |  8   ----------------------------<  203<H>  3.9   |  21<L>  |  0.73    Ca    10.0      10 Sep 2024 05:30  Phos  2.5     09-10  Mg     2.1     09-10    TPro  8.2  /  Alb  4.5  /  TBili  0.4  /  DBili  x   /  AST  17  /  ALT  21  /  AlkPhos  73  09-10      Urinalysis Basic - ( 10 Sep 2024 05:30 )    Color: x / Appearance: x / SG: x / pH: x  Gluc: 203 mg/dL / Ketone: x  / Bili: x / Urobili: x   Blood: x / Protein: x / Nitrite: x   Leuk Esterase: x / RBC: x / WBC x   Sq Epi: x / Non Sq Epi: x / Bacteria: x      CAPILLARY BLOOD GLUCOSE          OTHER LABS:        MICRODATA:      IMAGING:    EKG:

## 2024-09-13 NOTE — DISCHARGE NOTE PROVIDER - NSDCCPCAREPLAN_GEN_ALL_CORE_FT
PRINCIPAL DISCHARGE DIAGNOSIS  Diagnosis: Multiple sclerosis  Assessment and Plan of Treatment:      PRINCIPAL DISCHARGE DIAGNOSIS  Diagnosis: Multiple sclerosis  Assessment and Plan of Treatment: Multiple sclerosis (MS) is a potentially disabling disease of the brain and spinal cord (central nervous system).  In MS, the immune system attacks the protective sheath (myelin) that covers nerve fibers and causes communication problems between your brain and the rest of your body. Eventually, the disease can cause permanent damage or deterioration of the nerves.  Signs and symptoms of MS vary widely and depend on the amount of nerve damage and which nerves are affected. Some people with severe MS may lose the ability to walk independently or at all, while others may experience long periods of remission without any new symptoms.  There's no cure for multiple sclerosis. However, treatments can help speed recovery from attacks, modify the course of the disease and manage symptoms. You had a new lesion seen on your MRI brain.   You were treated with 5 days of IV steroids.   Please follow up with your outpatient Neurologist for further management of your MS.  Call 911 and return to the emergency room if you have chest pain, difficulty breathing, high fevers, worsening of your symptoms, feel unwell or have nausea and vomiting.

## 2024-09-13 NOTE — DISCHARGE NOTE NURSING/CASE MANAGEMENT/SOCIAL WORK - PATIENT PORTAL LINK FT
You can access the FollowMyHealth Patient Portal offered by [facility] by registering at the following website: [email or web address]. By joining [facility]’s FollowMyHealth portal, you will also be able to view your health information using other applications (apps) compatible with our system.

## 2024-09-13 NOTE — DISCHARGE NOTE PROVIDER - ATTENDING DISCHARGE PHYSICAL EXAMINATION:
MS AAO x 4, normal speech and comprehension, consolidates  CN II-XII in tact  motor full strength throughout  sensory in tact to LT and vib all ext   reflexes more brisk L than R but only 3+ is L knee  coord FNF mild L arm dysmetria  gait normal

## 2024-09-13 NOTE — CONSULT NOTE ADULT - ASSESSMENT
Ms. Sands is a 24-year-old female with a PMHx of MS, who presents for 1 week of new left leg dragging/incoordination concerning for a relapse, admitted to neurology for further management.    #C/f multiple sclerosis flare  #Left leg weakness  - On IV steroids per neurology, to end 9/13  - Planning for [facility] outpatient    #Leukocytosis - likely 2/2 steroids, no infectious sx  - CTM    #Hyperglycemia - iso steroids  - CTM    #DVT ppx  - Lovenox    
Ms. Sands is a 24-year-old female with a PMHx of MS, who presents for 1 week of new left leg dragging/incoordination concerning for a relapse, admitted to neurology for further management.    #C/f multiple sclerosis flare  #Left leg weakness  - On pulse steroids per neurology  - Pending MRI brain/spine  - Planning for [facility] outpatient    #Leukocytosis - likely 2/2 steroids, no infecitous sx  - CTM    #Hyperglycemia - glucose 203 on BMP iso steroids  - Would start FSG/SSI and obtain a1c    #DVT ppx  - Lovenox
Ms. Sands is a 24-year-old female with a PMHx of MS, who presents for 1 week of new left leg dragging/incoordination concerning for a relapse, admitted to neurology for further management.    #C/f multiple sclerosis flare  #Left leg weakness  - On IV steroids per neurology, to end 9/13  - Planning for O[facility]us outpatient    #Leukocytosis - likely 2/2 steroids, no infectous sx  - CTM    #Hyperglycemia - glucose 203 on BMP iso steroids  - Would start FSG/SSI and obtain a1c    #DVT ppx  - Lovenox    
Ms. Sands is a 24-year-old female with a PMHx of MS, who presents for 1 week of new left leg dragging/incoordination concerning for a relapse, admitted to neurology for further management.    #C/f multiple sclerosis flare  #Left leg weakness  - On pulse steroids per neurology  - Pending MRI brain/spine  - Planning for O[facility]vus outpatient    #Hyperglycemia - glucose 203 on BMP iso steroids  - Would start FSG/SSI and obtain a1c    #DVT ppx  - Lovenox

## 2024-09-13 NOTE — DISCHARGE NOTE PROVIDER - NSDCFUSCHEDAPPT_GEN_ALL_CORE_FT
Char Shi  [facility]  [facility] [address]  Scheduled Appointment: 11/05/2024

## 2024-09-17 PROBLEM — G35 MULTIPLE SCLEROSIS: Chronic | Status: ACTIVE | Noted: 2024-09-09
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
## 2024-09-20 DIAGNOSIS — D72.829 ELEVATED WHITE BLOOD CELL COUNT, UNSPECIFIED: ICD-10-CM

## 2024-09-20 DIAGNOSIS — R73.9 HYPERGLYCEMIA, UNSPECIFIED: ICD-10-CM

## 2024-09-20 DIAGNOSIS — G35 MULTIPLE SCLEROSIS: ICD-10-CM
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
## 2024-09-20 DIAGNOSIS — T38.0X5A ADVERSE EFFECT OF GLUCOCORTICOIDS AND SYNTHETIC ANALOGUES, INITIAL ENCOUNTER: ICD-10-CM

## 2024-10-30 ENCOUNTER — APPOINTMENT (OUTPATIENT)
Dept: NEUROLOGY | Facility: CLINIC | Age: 24
End: 2024-10-30
Payer: COMMERCIAL

## 2024-10-30 ENCOUNTER — NON-APPOINTMENT (OUTPATIENT)
Age: 24
End: 2024-10-30

## 2024-10-30 VITALS
BODY MASS INDEX: 34.15 KG/M2 | SYSTOLIC BLOOD PRESSURE: 117 MMHG | DIASTOLIC BLOOD PRESSURE: 79 MMHG | TEMPERATURE: 97.6 F | OXYGEN SATURATION: 96 % | WEIGHT: 200 LBS | HEIGHT: 64 IN | HEART RATE: 85 BPM

## 2024-10-30 DIAGNOSIS — H81.90 UNSPECIFIED DISORDER OF VESTIBULAR FUNCTION, UNSPECIFIED EAR: ICD-10-CM

## 2024-10-30 DIAGNOSIS — G35 MULTIPLE SCLEROSIS: ICD-10-CM

## 2024-10-30 DIAGNOSIS — H46.9 UNSPECIFIED OPTIC NEURITIS: ICD-10-CM

## 2024-10-30 PROCEDURE — 99215 OFFICE O/P EST HI 40 MIN: CPT

## 2024-10-30 PROCEDURE — G2211 COMPLEX E/M VISIT ADD ON: CPT | Mod: NC

## 2024-10-30 RX ORDER — UBIDECARENONE/VIT E ACET 100MG-5
CAPSULE ORAL
Refills: 0 | Status: ACTIVE | COMMUNITY

## 2024-10-31 LAB
ALBUMIN SERPL ELPH-MCNC: 4.2 G/DL
ALP BLD-CCNC: 60 U/L
ALT SERPL-CCNC: 13 U/L
ANION GAP SERPL CALC-SCNC: 11 MMOL/L
AST SERPL-CCNC: 13 U/L
BASOPHILS # BLD AUTO: 0.05 K/UL
BASOPHILS NFR BLD AUTO: 1 %
BILIRUB SERPL-MCNC: 0.4 MG/DL
BUN SERPL-MCNC: 8 MG/DL
CALCIUM SERPL-MCNC: 9.8 MG/DL
CD16+CD56+ CELLS # BLD: 97 CELLS/UL
CD16+CD56+ CELLS NFR BLD: 7 %
CD19 CELLS NFR BLD: 2 CELLS/UL
CD3 CELLS # BLD: 1291 CELLS/UL
CD3 CELLS NFR BLD: 90 %
CD3+CD4+ CELLS # BLD: 893 CELLS/UL
CD3+CD4+ CELLS NFR BLD: 62 %
CD3+CD4+ CELLS/CD3+CD8+ CLL SPEC: 2.42 RATIO
CD3+CD8+ CELLS # SPEC: 369 CELLS/UL
CD3+CD8+ CELLS NFR BLD: 26 %
CELLS.CD3-CD19+/CELLS IN BLOOD: <1 %
CHLORIDE SERPL-SCNC: 106 MMOL/L
CO2 SERPL-SCNC: 23 MMOL/L
CREAT SERPL-MCNC: 0.81 MG/DL
DEPRECATED KAPPA LC FREE/LAMBDA SER: 1.06 RATIO
EGFR: 103 ML/MIN/1.73M2
EOSINOPHIL # BLD AUTO: 0.18 K/UL
EOSINOPHIL NFR BLD AUTO: 3.7 %
GLUCOSE SERPL-MCNC: 82 MG/DL
HCT VFR BLD CALC: 43.7 %
HGB BLD-MCNC: 14.6 G/DL
IGA SER QL IEP: 180 MG/DL
IGG SER QL IEP: 1049 MG/DL
IGM SER QL IEP: 85 MG/DL
IMM GRANULOCYTES NFR BLD AUTO: 0.4 %
KAPPA LC CSF-MCNC: 1 MG/DL
KAPPA LC SERPL-MCNC: 1.06 MG/DL
LYMPHOCYTES # BLD AUTO: 1.43 K/UL
LYMPHOCYTES NFR BLD AUTO: 29.2 %
MAN DIFF?: NORMAL
MCHC RBC-ENTMCNC: 28 PG
MCHC RBC-ENTMCNC: 33.4 G/DL
MCV RBC AUTO: 83.7 FL
MONOCYTES # BLD AUTO: 0.63 K/UL
MONOCYTES NFR BLD AUTO: 12.9 %
NEUTROPHILS # BLD AUTO: 2.59 K/UL
NEUTROPHILS NFR BLD AUTO: 52.8 %
PLATELET # BLD AUTO: 255 K/UL
POTASSIUM SERPL-SCNC: 4.1 MMOL/L
PROT SERPL-MCNC: 6.8 G/DL
RBC # BLD: 5.22 M/UL
RBC # FLD: 12.9 %
SODIUM SERPL-SCNC: 139 MMOL/L
WBC # FLD AUTO: 4.9 K/UL

## 2024-11-05 ENCOUNTER — APPOINTMENT (OUTPATIENT)
Dept: OPHTHALMOLOGY | Facility: CLINIC | Age: 24
End: 2024-11-05

## 2025-03-24 ENCOUNTER — NON-APPOINTMENT (OUTPATIENT)
Age: 25
End: 2025-03-24

## 2025-09-05 ENCOUNTER — NON-APPOINTMENT (OUTPATIENT)
Age: 25
End: 2025-09-05

## 2025-09-05 ENCOUNTER — LABORATORY RESULT (OUTPATIENT)
Age: 25
End: 2025-09-05

## 2025-09-05 ENCOUNTER — APPOINTMENT (OUTPATIENT)
Dept: NEUROLOGY | Facility: CLINIC | Age: 25
End: 2025-09-05
Payer: COMMERCIAL

## 2025-09-05 VITALS
HEART RATE: 97 BPM | OXYGEN SATURATION: 95 % | HEIGHT: 64 IN | SYSTOLIC BLOOD PRESSURE: 116 MMHG | DIASTOLIC BLOOD PRESSURE: 83 MMHG | WEIGHT: 200 LBS | BODY MASS INDEX: 34.15 KG/M2

## 2025-09-05 VITALS — OXYGEN SATURATION: 98 % | HEART RATE: 78 BPM | HEIGHT: 64 IN

## 2025-09-05 DIAGNOSIS — G35 MULTIPLE SCLEROSIS: ICD-10-CM

## 2025-09-05 DIAGNOSIS — H46.9 UNSPECIFIED OPTIC NEURITIS: ICD-10-CM

## 2025-09-05 PROCEDURE — 99214 OFFICE O/P EST MOD 30 MIN: CPT
